# Patient Record
Sex: MALE | Race: WHITE
[De-identification: names, ages, dates, MRNs, and addresses within clinical notes are randomized per-mention and may not be internally consistent; named-entity substitution may affect disease eponyms.]

---

## 2023-10-08 ENCOUNTER — HOSPITAL ENCOUNTER (EMERGENCY)
Age: 73
Discharge: HOME | End: 2023-10-08
Payer: MEDICARE

## 2023-10-08 VITALS
DIASTOLIC BLOOD PRESSURE: 74 MMHG | HEART RATE: 76 BPM | TEMPERATURE: 98.1 F | OXYGEN SATURATION: 96 % | SYSTOLIC BLOOD PRESSURE: 135 MMHG | RESPIRATION RATE: 16 BRPM

## 2023-10-08 VITALS — BODY MASS INDEX: 25.8 KG/M2

## 2023-10-08 DIAGNOSIS — S09.8XXA: ICD-10-CM

## 2023-10-08 DIAGNOSIS — S01.01XA: Primary | ICD-10-CM

## 2023-10-08 DIAGNOSIS — Z23: ICD-10-CM
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
## 2023-10-08 PROCEDURE — 90471 IMMUNIZATION ADMIN: CPT

## 2023-10-08 PROCEDURE — 90715 TDAP VACCINE 7 YRS/> IM: CPT

## 2023-10-08 PROCEDURE — 12002 RPR S/N/AX/GEN/TRNK2.6-7.5CM: CPT

## 2023-10-08 PROCEDURE — 99284 EMERGENCY DEPT VISIT MOD MDM: CPT

## 2023-10-08 PROCEDURE — 70450 CT HEAD/BRAIN W/O DYE: CPT

## 2023-10-08 NOTE — ED_ITS
HPI - Head Injury    
General    
Chief complaint: Head Injury    
Stated complaint: FALL/LACERATION ON BACK OF HEAD    
Time Seen by Provider: 10/08/23 14:27    
Source: patient and family    
Mode of arrival: walk-in    
Limitations: no limitations    
History of Present Illness    
HPI Narrative:     
Patient fell back while in his camper and struck the back of his head along a   
cabinet edge.  No LOC and he denies any headache at this time. He sustained a   
large laceration to the back of the head.  The bleeding has slowed substantially  
from what it was initially, the lady accompanying the patient told me.  His   
tetanus is not up to date.    
He does not take blood thinners.    
No nausea, vomiting. No blurred vision or ringing in the ears. no ear pain, neck  
pain or back pain.    
Related Data    
                                    Allergies    
    
    
    
Allergy/AdvReac Type Severity Reaction Status Date / Time    
     
cephalexin [From Keflex] Allergy Severe Anaphylaxis Verified 10/08/23 14:11    
    
    
    
    
[facility]    
[facility]    
Social History    
Smoking status:  Never smoker     
    
    
    
Exam    
Narrative    
Exam Narrative:     
Nurses note and vital signs reviewed and patient is not hypoxic.    
afebrile    
General:  The patient appears well and in no apparent distress.  Patient is   
resting comfortably on cart.  GCS = 15.    
Skin:  Warm, dry, no pallor noted.    
Head:  7cm vertical, linear laceration to the occiput. Remainder of the face and  
scalp are normocephalic, atraumatic    
Neck:  Supple, trachea mid-line.  Full ROM and no cervical spinal tenderness.     
Eyes:  PERRLA, EOMI    
ENT:  TMs clear, no hemotympanum detected, no blood in posterior oropharynx    
Cardiovascular:  Regular Rate and Rhythm    
Respiratory:  Patient is in no distress, no accessory muscle use, lungs are   
clear to auscultation, no wheezing, rales or rhonchi    
Chest Wall:  no tenderness, no flail chest, contusion, abrasion, or signs of   
trauma.    
Back: No thoracic or lumbar tenderness to palpation.  Negative straight leg   
raise bilaterally.    
Musculoskeletal:  no sign of long bone fracture.  Moves all four extremities in   
all modalities with 5/5 strength.    
Neurological:  A&O x4, normal equal  strength, normal finger to nose, normal  
speech, normal coordination, normal motor, normal sensory.    
Psychiatric:  Cooperative    
Constitutional    
Vital Signs, click to edit/add:     
    
                                Last Vital Signs    
    
    
    
Temp  98.1 F   10/08/23 14:07    
     
Pulse  76   10/08/23 14:07    
     
Resp  16   10/08/23 14:07    
     
BP  135/74   10/08/23 14:07    
     
Pulse Ox  96   10/08/23 14:07    
     
O2 Del Method  Room Air  10/08/23 14:07    
    
    
    
    
    
Course    
Vital Signs    
Vital signs:     
    
                                   Vital Signs    
    
    
    
Temperature  98.1 F   10/08/23 14:07    
     
Pulse Rate  76   10/08/23 14:07    
     
Respiratory Rate  16   10/08/23 14:07    
     
Blood Pressure  135/74   10/08/23 14:07    
     
Pulse Oximetry  96   10/08/23 14:07    
     
Oxygen Delivery Method  Room Air  10/08/23 14:07    
    
    
                                            
    
    
    
Temperature  98.1 F   10/08/23 14:07    
     
Pulse Rate  76   10/08/23 14:07    
     
Respiratory Rate  16   10/08/23 14:07    
     
Blood Pressure  135/74   10/08/23 14:07    
     
Pulse Oximetry  96   10/08/23 14:07    
     
Oxygen Delivery Method  Room Air  10/08/23 14:07    
    
    
    
    
    
MDM - Head Injury    
MDM Narrative    
Medical decision making narrative:     
the patient's tetanus was updated. He was sent for noncontrast CT scanning of   
the brain. I then applied sterile staples to close the wound. Please see the   
procedure note above    
with CT did not reveal any acute intracranial hemorrhage or skull fracture. The   
patient was given reassurance and discharged home. He will need to have his   
staples removed in 7-10 days as described in the procedure note above.    
Imaging Data    
CT scan - head:     
      Attestation: I have reviewed the pertinent imaging results.    
      My impression:     
no ICH or skull fx    
      Radiologist's impression:     
Patient Name:    
KLEBER BYRD    
MRN: [id number]    
    YOB: 1950    Sex: M    
Assigned Patient Location:     
Current Patient Location:     
Accession/Order Number: [id number]    
Exam Date: 10/08/2023  14:34    Report    
 Date: 10/08/2023  15:07    
At the request of:    
ROBERT SERNA      
Procedure:  CT head/brain wo con    
CT head/brain wo con     
CLINICAL HISTORY:    
 occipital head trauma.    
COMPARISON: None available.    
CT HEAD    
 TECHNIQUE:    
 Noncontrast axial    
 CT images    
 of the    
 brain were obtained from    
the vertex    
 through    
 the    
 skull base. Sagittal and coronal    
 reconstructions. Bone     
and brain windows are provided for review.    
Dose reduction techniques    
 were    
 achieved    
 by using automated exposure control     
and/or adjustment of mA and/or    
 kV according    
 to patient size and/or    
 use of     
iterative reconstruction technique.    
CT HEAD    
 FINDINGS:    
Posterior scalp with mild contusion and    
 lacerations but no calvarial fracture.    
Global volume loss    
 and ex vacuo prominence of the ventricles.     
Multifocal white matter hypodensity is    
 nonspecific but likely from small vessel     
ischemic disease.    
No mass, midline    
 shift, acute bleed, or    
 extra-axial    
 fluid    
 collection.    
No CT evidence of acute    
 large territorial infarction.    
Visualized paranasal sinuses are clear.    
The mastoid air    
 cells are well aerated.    
Orbital contents are free    
 of disease.     
IMPRESSION: Mild posterior scalp injury but no underlying calvarial    
 fracture or     
acute intracranial    
 process.    
Electronically authenticated by: GUSTAVO SMALLWOOD   Date: 10/08/2023  15:07    
    
Discharge Plan    
Discharge    
Chief Complaint: Head Injury    
    
Clinical Impression:    
 Closed head injury, Laceration of occipital scalp    
    
    
Patient Disposition: Home, Self-Care    
    
Time of Disposition Decision: 15:11    
    
Instructions:  Laceration (ED), Head Injury (ED), Staple Care (ED)    
    
Stand Alone Forms:  Portal Instructions    
    
Referrals:    
KATELYNN BARKER [Primary Care Provider] - 1 week    
    
    
Procedures    
ED Laceration    
Laceration    
Laceration 1:     
      Additional comments:     
occipital scalp laceration    
Laceration repair:  All of the procedure was done under sterile conditions.    
Wound cleansed with betadine and anesthetized with local injection of   
approximately 8mL of lidocaine 1% with epinephrine.  The wound was irrigated   
copiously with sterile normal saline.  The wound was explored to depth and found  
to be free of foreign material.  The laceration wound edges were well-  
approximated and did not require revision.  Wound closed with 8 sterile staples   
in simple interrupted fashion.  Patient tolerated the procedure well.  The   
patient was neurovascularly intact post-repair.  Topical bacitracin applied to   
the laceration and it was dressed with a dry sterile dressing.  The patient will  
need to follow-up in the next 7-10 days for removal.

## 2023-10-08 NOTE — ED.HEATRA1
HPI - Head Injury
General
Chief complaint: Head Injury
Stated complaint: FALL/LACERATION ON BACK OF HEAD
Time Seen by Provider: 10/08/23 14:27
Source: patient and family
Mode of arrival: walk-in
Limitations: no limitations
History of Present Illness
HPI Narrative: 
Patient fell back while in his camper and struck the back of his head along a cabinet edge.  No LOC and he denies any headache at this time. He sustained a large laceration to the back of the head.  The bleeding has slowed substantially from what it 
was initially, the lady accompanying the patient told me.  His tetanus is not up to date.
He does not take blood thinners.
No nausea, vomiting. No blurred vision or ringing in the ears. no ear pain, neck pain or back pain.
Related Data
Allergies

Allergy/AdvReac Type Severity Reaction Status Date / Time
cephalexin [From Keflex] Allergy Severe Anaphylaxis Verified 10/08/23 14:11



\[facility] [place] [facility]\""H
[facility]
Social History
Smoking status:  Never smoker 



Exam
Narrative
Exam Narrative: 
Nurses note and vital signs reviewed and patient is not hypoxic.
afebrile
General:  The patient appears well and in no apparent distress.  Patient is resting comfortably on cart.  GCS = 15.
Skin:  Warm, dry, no pallor noted.
Head:  7cm vertical, linear laceration to the occiput. Remainder of the face and scalp are normocephalic, atraumatic
Neck:  Supple, trachea mid-line.  Full ROM and no cervical spinal tenderness. 
Eyes:  PERRLA, EOMI
ENT:  TMs clear, no hemotympanum detected, no blood in posterior oropharynx
Cardiovascular:  Regular Rate and Rhythm
Respiratory:  Patient is in no distress, no accessory muscle use, lungs are clear to auscultation, no wheezing, rales or rhonchi
Chest Wall:  no tenderness, no flail chest, contusion, abrasion, or signs of trauma.
Back: No thoracic or lumbar tenderness to palpation.  Negative straight leg raise bilaterally.
Musculoskeletal:  no sign of long bone fracture.  Moves all four extremities in all modalities with 5/5 strength.
Neurological:  A&O x4, normal equal  strength, normal finger to nose, normal speech, normal coordination, normal motor, normal sensory.
Psychiatric:  Cooperative
Constitutional
Vital Signs, click to edit/add: 

Last Vital Signs

Temp  98.1 F   10/08/23 14:07
Pulse  76   10/08/23 14:07
Resp  16   10/08/23 14:07
BP  135/74   10/08/23 14:07
Pulse Ox  96   10/08/23 14:07
O2 Del Method  Room Air  10/08/23 14:07




Course
Vital Signs
Vital signs: 

Vital Signs

Temperature  98.1 F   10/08/23 14:07
Pulse Rate  76   10/08/23 14:07
Respiratory Rate  16   10/08/23 14:07
Blood Pressure  135/74   10/08/23 14:07
Pulse Oximetry  96   10/08/23 14:07
Oxygen Delivery Method  Room Air  10/08/23 14:07



Temperature  98.1 F   10/08/23 14:07
Pulse Rate  76   10/08/23 14:07
Respiratory Rate  16   10/08/23 14:07
Blood Pressure  135/74   10/08/23 14:07
Pulse Oximetry  96   10/08/23 14:07
Oxygen Delivery Method  Room Air  10/08/23 14:07




MDM - Head Injury
MDM Narrative
Medical decision making narrative: 
the patient's tetanus was updated. He was sent for noncontrast CT scanning of the brain. I then applied sterile staples to close the wound. Please see the procedure note above
with CT did not reveal any acute intracranial hemorrhage or skull fracture. The patient was given reassurance and discharged home. He will need to have his staples removed in 7-10 days as described in the procedure note above.
Imaging Data
CT scan - head: 
      Attestation: I have reviewed the pertinent imaging results.
      My impression: 
no ICH or skull fx
      Radiologist's impression: 
Patient Name:
KLEBER BYRD
MRN: [id number]
    YOB: 1950    Sex: M
Assigned Patient Location: 
Current Patient Location: 
Accession/Order Number: [id number]
Exam Date: 10/08/2023  14:34    Report
 Date: 10/08/2023  15:07
At the request of:
ROBERT SERNA  
Procedure:  CT head/brain wo con
CT head/brain wo con 
CLINICAL HISTORY:
 occipital head trauma.
COMPARISON: None available.
CT HEAD
 TECHNIQUE:
 Noncontrast axial
 CT images
 of the
 brain were obtained from
the vertex
 through
 the
 skull base. Sagittal and coronal
 reconstructions. Bone 
and brain windows are provided for review.
Dose reduction techniques
 were
 achieved
 by using automated exposure control 
and/or adjustment of mA and/or
 kV according
 to patient size and/or
 use of 
iterative reconstruction technique.
CT HEAD
 FINDINGS:
Posterior scalp with mild contusion and
 lacerations but no calvarial fracture.
Global volume loss
 and ex vacuo prominence of the ventricles. 
Multifocal white matter hypodensity is
 nonspecific but likely from small vessel 
ischemic disease.
No mass, midline
 shift, acute bleed, or
 extra-axial
 fluid
 collection.
No CT evidence of acute
 large territorial infarction.
Visualized paranasal sinuses are clear.
The mastoid air
 cells are well aerated.
Orbital contents are free
 of disease. 
IMPRESSION: Mild posterior scalp injury but no underlying calvarial
 fracture or 
acute intracranial
 process.
Electronically authenticated by: GUSTAVO SMALLWOOD   Date: 10/08/2023  15:07

Discharge Plan
Discharge
Chief Complaint: Head Injury

Clinical Impression:
 Closed head injury, Laceration of occipital scalp


Patient Disposition: Home, Self-Care

Time of Disposition Decision: 15:11

Instructions:  Laceration (ED), Head Injury (ED), Staple Care (ED)

Stand Alone Forms:  Portal Instructions

Referrals:
KATELYNN BARKER [Primary Care Provider] - 1 week


Procedures
ED Laceration
Laceration
Laceration 1: 
      Additional comments: 
occipital scalp laceration
Laceration repair:  All of the procedure was done under sterile conditions.  Wound cleansed with betadine and anesthetized with local injection of approximately 8mL of lidocaine 1% with epinephrine.  The wound was irrigated copiously with sterile 
normal saline.  The wound was explored to depth and found to be free of foreign material.  The laceration wound edges were well-approximated and did not require revision.  Wound closed with 8 sterile staples in simple interrupted fashion.  Patient 
tolerated the procedure well.  The patient was neurovascularly intact post-repair.  Topical bacitracin applied to the laceration and it was dressed with a dry sterile dressing.  The patient will need to follow-up in the next 7-10 days for removal.

## 2023-10-08 NOTE — CT_ITS
The [address] [address] 
     [phone] 
  
  
Patient Name: 
KLEBER BYRD 
  
MRN: [id number]    YOB: 1950    Sex: M 
Assigned Patient Location: ER 
Current Patient Location: ER 
Accession/Order Number: [id number] 
Exam Date: 10/08/2023  14:34    Report Date: 10/08/2023  15:07 
  
At the request of: 
ROBERT SERNA   
  
Procedure:  CT head/brain wo con 
  
CT head/brain wo con  
  
CLINICAL HISTORY: occipital head trauma. 
  
COMPARISON: None available. 
  
CT HEAD TECHNIQUE: Noncontrast axial CT images of the brain were obtained from  
  
the vertex through the skull base. Sagittal and coronal reconstructions. Bone  
and brain windows are provided for review. 
  
Dose reduction techniques were achieved by using automated exposure control  
and/or adjustment of mA and/or kV according to patient size and/or use of  
iterative reconstruction technique. 
  
CT HEAD FINDINGS: 
Posterior scalp with mild contusion and lacerations but no calvarial fracture. 
Global volume loss and ex vacuo prominence of the ventricles.  
Multifocal white matter hypodensity is nonspecific but likely from small  
vessel  
ischemic disease. 
No mass, midline shift, acute bleed, or extra-axial fluid collection. 
No CT evidence of acute large territorial infarction. 
  
Visualized paranasal sinuses are clear. 
The mastoid air cells are well aerated. 
Orbital contents are free of disease.  
  
ORDER #: [id number] CT/CT head/brain wo con  
IMPRESSION: Mild posterior scalp injury but no underlying calvarial fracture   
or   
acute intracranial process.  
   
   
Electronically authenticated by: GUSTAVO SMALLWOOD   Date: 10/08/2023  15:07

## 2023-10-09 PROCEDURE — 93306 TTE W/DOPPLER COMPLETE: CPT | Performed by: INTERNAL MEDICINE

## 2023-10-28 PROBLEM — H90.3 BILATERAL SENSORINEURAL HEARING LOSS: Status: ACTIVE | Noted: 2023-10-28

## 2023-10-28 PROBLEM — R26.89 IMBALANCE: Status: ACTIVE | Noted: 2023-10-28

## 2023-10-28 RX ORDER — MECLIZINE HYDROCHLORIDE 25 MG/1
TABLET ORAL
COMMUNITY
End: 2023-10-30 | Stop reason: ALTCHOICE

## 2023-10-30 ENCOUNTER — OFFICE VISIT (OUTPATIENT)
Dept: CARDIOLOGY | Facility: CLINIC | Age: 73
End: 2023-10-30
Payer: MEDICARE

## 2023-10-30 VITALS
HEIGHT: 70 IN | BODY MASS INDEX: 27.06 KG/M2 | WEIGHT: 189 LBS | SYSTOLIC BLOOD PRESSURE: 152 MMHG | HEART RATE: 77 BPM | DIASTOLIC BLOOD PRESSURE: 94 MMHG

## 2023-10-30 DIAGNOSIS — I10 ESSENTIAL HYPERTENSION, BENIGN: ICD-10-CM

## 2023-10-30 DIAGNOSIS — I35.0 NONRHEUMATIC AORTIC VALVE STENOSIS: Primary | ICD-10-CM

## 2023-10-30 PROCEDURE — 3008F BODY MASS INDEX DOCD: CPT | Performed by: INTERNAL MEDICINE

## 2023-10-30 PROCEDURE — 3080F DIAST BP >= 90 MM HG: CPT | Performed by: INTERNAL MEDICINE

## 2023-10-30 PROCEDURE — 93000 ELECTROCARDIOGRAM COMPLETE: CPT | Performed by: INTERNAL MEDICINE

## 2023-10-30 PROCEDURE — 1159F MED LIST DOCD IN RCRD: CPT | Performed by: INTERNAL MEDICINE

## 2023-10-30 PROCEDURE — 99204 OFFICE O/P NEW MOD 45 MIN: CPT | Performed by: INTERNAL MEDICINE

## 2023-10-30 PROCEDURE — 1036F TOBACCO NON-USER: CPT | Performed by: INTERNAL MEDICINE

## 2023-10-30 PROCEDURE — 1160F RVW MEDS BY RX/DR IN RCRD: CPT | Performed by: INTERNAL MEDICINE

## 2023-10-30 PROCEDURE — 3077F SYST BP >= 140 MM HG: CPT | Performed by: INTERNAL MEDICINE

## 2023-10-30 RX ORDER — ZOLPIDEM TARTRATE 10 MG/1
5 TABLET ORAL NIGHTLY PRN
COMMUNITY
Start: 2023-09-29

## 2023-10-30 ASSESSMENT — ENCOUNTER SYMPTOMS
DIZZINESS: 1
DYSPNEA ON EXERTION: 1

## 2023-10-30 NOTE — PATIENT INSTRUCTIONS
Please bring all medicines, vitamins, and herbal supplements with you when you come to the office.    Prescriptions will not be filled unless you are compliant with your follow up appointments or have a follow up appointment scheduled as per instruction of your physician. Refills should be requested at the time of your visit.     Fall Prevention Education Given    Echo reviewed  Signs and symptoms reviewed  Reduce salt intake. Monitor b/p at home  Follow up 6 months

## 2023-10-30 NOTE — PROGRESS NOTES
Subjective   Carl Sherman is a 73 y.o. male       Chief Complaint    New Patient Visit          HPI     Patient is 73-year-old white male who recently noted to have a cardiac murmur which led to an echocardiogram that showing moderate severe aortic stenosis.  The patient is a .  He remains very active.  He denies any complaint of chest pain, palpitation, or syncope.  He reports very minor shortness of breath with heavy activity when he carry heavy stuff.  And rare dizziness.  His recent echocardiogram noted and reviewed with him.  The patient noted today to have slightly elevated the blood pressure.  Patient admits to salt indiscretion.  Recent echo noted and reviewed with him.    Assessment    1.  Moderate to severe nonrheumatic aortic stenosis does not appear to be symptomatic  2.  Probably stage I hypertension  3.  Rare intermittent edema  4.  Patient reports salt indiscretion  5.  Mildly overweight  6.  Multiple orthopedic procedures in the past  7.  Chronic back pain with prior back surgery    Plan    1.  Currently the patient appears in asymptomatic based on his description.  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  We will continue with observant approach  2.  The natural history of aortic stenosis discussed with him  3.  I advised him to notify if develop any chest pain, increasing shortness of breath or dizziness  4.  The patient on his way to [place].  I advised him to restrict his salt intake and to monitor his blood pressure and notify me if it remains elevated  5.  I will see him back in the office in 6 months for follow-up    Review of Systems   Constitutional: Positive for malaise/fatigue.   Cardiovascular:  Positive for dyspnea on exertion and leg swelling.   Musculoskeletal:         Chronic back pain   Neurological:  Positive for dizziness.   All other systems reviewed and are negative.         Visit Vitals  BP (!) 152/94 (BP Location:  "Right arm, Patient Position: Sitting)   Pulse 77   Ht 1.778 m (5' 10\")   Wt 85.7 kg (189 lb)   BMI 27.12 kg/m²   Smoking Status Never   BSA 2.06 m²        Objective   Physical Exam  Constitutional:       Appearance: Normal appearance. He is normal weight.   HENT:      Nose: Nose normal.   Neck:      Vascular: No carotid bruit.   Cardiovascular:      Rate and Rhythm: Normal rate.      Pulses: Normal pulses.      Heart sounds: Murmur heard.      Systolic murmur is present with a grade of 3/6.   Pulmonary:      Effort: Pulmonary effort is normal.   Abdominal:      General: Bowel sounds are normal.      Palpations: Abdomen is soft.   Genitourinary:     Rectum: Normal.   Musculoskeletal:         General: Normal range of motion.      Cervical back: Normal range of motion.      Right lower leg: No edema.      Left lower leg: No edema.   Skin:     General: Skin is warm and dry.   Neurological:      General: No focal deficit present.      Mental Status: He is alert.   Psychiatric:         Mood and Affect: Mood normal.         Behavior: Behavior normal.         Thought Content: Thought content normal.         Judgment: Judgment normal.         Current Medications    Current Outpatient Medications:     zolpidem (Ambien) 10 mg tablet, 0.5 tablets (5 mg) as needed at bedtime., Disp: , Rfl:                      Assessment/Plan   1. Nonrheumatic aortic valve stenosis  ECG 12 Lead    Follow Up In Cardiology      2. BMI 27.0-27.9,adult        3. Essential hypertension, benign            "

## 2024-04-16 ENCOUNTER — APPOINTMENT (OUTPATIENT)
Dept: CARDIOLOGY | Facility: CLINIC | Age: 74
End: 2024-04-16
Payer: MEDICARE

## 2024-04-25 ENCOUNTER — OFFICE VISIT (OUTPATIENT)
Dept: CARDIOLOGY | Facility: CLINIC | Age: 74
End: 2024-04-25
Payer: MEDICARE

## 2024-04-25 VITALS
WEIGHT: 196 LBS | HEART RATE: 64 BPM | HEIGHT: 70 IN | SYSTOLIC BLOOD PRESSURE: 148 MMHG | BODY MASS INDEX: 28.06 KG/M2 | DIASTOLIC BLOOD PRESSURE: 86 MMHG

## 2024-04-25 DIAGNOSIS — I10 ESSENTIAL HYPERTENSION, BENIGN: Primary | ICD-10-CM

## 2024-04-25 DIAGNOSIS — I35.0 NONRHEUMATIC AORTIC VALVE STENOSIS: ICD-10-CM

## 2024-04-25 DIAGNOSIS — R06.02 SHORTNESS OF BREATH: ICD-10-CM

## 2024-04-25 DIAGNOSIS — R26.89 IMBALANCE: ICD-10-CM

## 2024-04-25 DIAGNOSIS — Z78.9 NEVER SMOKED TOBACCO: ICD-10-CM

## 2024-04-25 DIAGNOSIS — I10 WHITE COAT SYNDROME WITH DIAGNOSIS OF HYPERTENSION: ICD-10-CM

## 2024-04-25 PROCEDURE — 1160F RVW MEDS BY RX/DR IN RCRD: CPT | Performed by: INTERNAL MEDICINE

## 2024-04-25 PROCEDURE — 1159F MED LIST DOCD IN RCRD: CPT | Performed by: INTERNAL MEDICINE

## 2024-04-25 PROCEDURE — 3077F SYST BP >= 140 MM HG: CPT | Performed by: INTERNAL MEDICINE

## 2024-04-25 PROCEDURE — 1036F TOBACCO NON-USER: CPT | Performed by: INTERNAL MEDICINE

## 2024-04-25 PROCEDURE — 99215 OFFICE O/P EST HI 40 MIN: CPT | Performed by: INTERNAL MEDICINE

## 2024-04-25 PROCEDURE — 3079F DIAST BP 80-89 MM HG: CPT | Performed by: INTERNAL MEDICINE

## 2024-04-25 ASSESSMENT — ENCOUNTER SYMPTOMS
LIGHT-HEADEDNESS: 1
SHORTNESS OF BREATH: 1

## 2024-04-25 NOTE — PATIENT INSTRUCTIONS
Please bring all medicines, vitamins, and herbal supplements with you when you come to the office.    Prescriptions will not be filled unless you are compliant with your follow up appointments or have a follow up appointment scheduled as per instruction of your physician. Refills should be requested at the time of your visit.     Fall Prevention Education Given     BMI was above normal measurement. Current weight: 88.9 kg (196 lb)  Weight change since last visit (-) denotes wt loss 7 lbs   Weight loss needed to achieve BMI 25: 22.1 Lbs  Weight loss needed to achieve BMI 30: -12.6 Lbs  Provided instructions on dietary changes.    Damian  Heart cath  Follow up after testing

## 2024-04-25 NOTE — PROGRESS NOTES
"Subjective   Carl Sherman is a 74 y.o. male       Chief Complaint    Follow-up          HPI   Patient is here for follow-up continue management for aortic stenosis.  He was seen recently for close to severe range aortic stenosis at that point of time he reported he was asymptomatic.  Over the last 3 to 4 months the patient has describing fatigue, tiredness, shortness of breath with activity with functional class II-III.  And occasional lightheadedness.  His clinical symptoms highly suggestive for progression of his aortic valve disease.  He had no other complaint.    Assessment     1.  Moderate to severe nonrheumatic aortic stenosis appears symptomatic with increasing shortness of breath and lightheadedness  2.  Probably stage I hypertension with whitecoat hypertension  3.  Rare intermittent edema  4.  Patient reports salt indiscretion  5.  Mildly overweight  6.  Multiple orthopedic procedures in the past  7.  Chronic back pain with prior back surgery     Plan     1.  Based on patient's symptoms I am highly concerned that his aortic valve is now symptomatic.  I recommended proceeding transesophageal echocardiogram and cardiac catheterization.  Risk, benefits alternative reviewed with patient at length he understood and agreed  2.  The natural history of aortic stenosis discussed with him  3.  The process of proceeding with TAVR discussed with him including the need for CTA all of the above reviewed with him and his wife more than 30 minutes spent discussing all those issues  4.  Follow-up after testing is done  Review of Systems   Constitutional: Positive for malaise/fatigue.   Respiratory:  Positive for shortness of breath.    Neurological:  Positive for light-headedness.            Vitals:    04/25/24 1402   BP: 148/86   BP Location: Right arm   Patient Position: Sitting   Pulse: 64   Weight: 88.9 kg (196 lb)   Height: 1.778 m (5' 10\")        Objective   Physical Exam  Constitutional:       Appearance: Normal " appearance.   HENT:      Nose: Nose normal.   Neck:      Vascular: No carotid bruit.   Cardiovascular:      Rate and Rhythm: Normal rate.      Pulses: Normal pulses.      Heart sounds: Murmur heard.      Systolic murmur is present with a grade of 3/6.   Pulmonary:      Effort: Pulmonary effort is normal.   Abdominal:      General: Bowel sounds are normal.      Palpations: Abdomen is soft.   Musculoskeletal:         General: Normal range of motion.      Cervical back: Normal range of motion.      Right lower leg: No edema.      Left lower leg: No edema.   Skin:     General: Skin is warm and dry.   Neurological:      General: No focal deficit present.      Mental Status: He is alert.   Psychiatric:         Mood and Affect: Mood normal.         Behavior: Behavior normal.         Thought Content: Thought content normal.         Judgment: Judgment normal.         Allergies  Cephalexin     Current Medications    Current Outpatient Medications:     zolpidem (Ambien) 10 mg tablet, 0.5 tablets (5 mg) as needed at bedtime., Disp: , Rfl:                      Assessment/Plan   1. Essential hypertension, benign        2. Nonrheumatic aortic valve stenosis  Follow Up In Cardiology    Follow Up In Cardiology    Transesophageal Echo (PACHECO)      3. BMI 27.0-27.9,adult        4. Imbalance        5. Never smoked tobacco        6. Shortness of breath  Transesophageal Echo (PACHECO)      7. White coat syndrome with diagnosis of hypertension                 Scribe Attestation  By signing my name below, Marianna MTZ LPN, Scribe   attest that this documentation has been prepared under the direction and in the presence of Lizette Zapata MD.     Provider Attestation - Scribe documentation    All medical record entries made by the Aubreeibjaquelin were at my direction and personally dictated by me. I have reviewed the chart and agree that the record accurately reflects my personal performance of the history, physical exam, discussion and plan.

## 2024-04-26 ENCOUNTER — TELEPHONE (OUTPATIENT)
Dept: CARDIOLOGY | Facility: CLINIC | Age: 74
End: 2024-04-26
Payer: MEDICARE

## 2024-04-26 NOTE — TELEPHONE ENCOUNTER
PACHECO/[id number] DX-I35.0, R06.02   LEFT HEART CATH/63371 DX-I35.0, R06.02 NO AUTH REQUIRED WITH MEDICARE

## 2024-05-07 LAB
NON-UH HIE ANION GAP: 11 (ref 6–15)
NON-UH HIE BASOPHILS # (AUTO): 0 10*3/UL (ref 0–0.2)
NON-UH HIE BASOPHILS % (AUTO): 1.1 %
NON-UH HIE BLOOD UREA NITROGEN: 18 MG/DL (ref 7–25)
NON-UH HIE CARBON DIOXIDE: 29.8 MMOL/L (ref 21–31)
NON-UH HIE CHLORIDE: 104 MMOL/L (ref 98–107)
NON-UH HIE CHOL/HDL RATIO: 3
NON-UH HIE CHOLESTEROL: 194 MG/DL (ref 140–200)
NON-UH HIE CREATININE: 0.87 MG/DL (ref 0.7–1.3)
NON-UH HIE EOSINOPHILS # (AUTO): 0.1 10*3/UL (ref 0–0.45)
NON-UH HIE EOSINOPHILS % (AUTO): 2.3 %
NON-UH HIE ESTIMATED GFR: > 60
NON-UH HIE HDL CHOLESTEROL: 64 MG/DL (ref 23–92)
NON-UH HIE HEMATOCRIT: 45.6 % (ref 38.8–50)
NON-UH HIE HEMOGLOBIN: 15.9 G/DL (ref 13–17)
NON-UH HIE INR: 1
NON-UH HIE LDL CHOLESTEROL,CALCULATED: 117 MG/DL (ref 0–100)
NON-UH HIE LYMPHOCYTES # (AUTO): 1.1 10*3/UL (ref 1–4.8)
NON-UH HIE LYMPHOCYTES % (AUTO): 24 %
NON-UH HIE MEAN CORPUSCULAR HEMOGLOBIN: 32.8 PG (ref 27.5–35.2)
NON-UH HIE MEAN CORPUSCULAR HGB CONC: 34.9 G/DL (ref 32.5–35.6)
NON-UH HIE MEAN CORPUSCULAR VOLUME: 94.2 FL (ref 83.5–101)
NON-UH HIE MEAN PLATELET VOLUME: 8.9 FL (ref 6.6–10.1)
NON-UH HIE MONOCYTES # (AUTO): 0.4 10*3/UL (ref 0–0.8)
NON-UH HIE MONOCYTES % (AUTO): 9.4 %
NON-UH HIE NEUTROPHILS # (AUTO): 2.8 10*3/UL (ref 1.8–7.7)
NON-UH HIE NEUTROPHILS % (AUTO): 63.2 %
NON-UH HIE NRBC%: 0.2 /100{WBC} (ref 0–0.5)
NON-UH HIE PARTIAL THROMBOPLASTIN TIME: 33.8 S (ref 25.1–36.5)
NON-UH HIE PLATELET COUNT: 178 10*3/UL (ref 150–450)
NON-UH HIE POTASSIUM: 4.8 MMOL/L (ref 3.5–5.1)
NON-UH HIE PROTHROMBIN TIME: 11.3 S (ref 9–12.9)
NON-UH HIE RED BLOOD COUNT: 4.84 (ref 3.9–5.6)
NON-UH HIE RED CELL DISTRIBUTION WIDTH: 13.3 % (ref 12–14.8)
NON-UH HIE SODIUM: 140 MMOL/L (ref 136–145)
NON-UH HIE TRIGLYCERIDE W/REFLEX: 64 MG/DL (ref 0–149)
NON-UH HIE UNCORRECTED WBC: 4.5 10*3/UL (ref 4.1–10.5)
NON-UH HIE VLDL CHOLESTEROL: 12 MG/DL
NON-UH HIE WHITE BLOOD COUNT: 4.5 10*3/UL (ref 4.1–10.5)

## 2024-05-15 ENCOUNTER — TELEPHONE (OUTPATIENT)
Dept: CARDIOLOGY | Facility: HOSPITAL | Age: 74
End: 2024-05-15
Payer: MEDICARE

## 2024-05-15 ENCOUNTER — TELEPHONE (OUTPATIENT)
Dept: CARDIOLOGY | Facility: CLINIC | Age: 74
End: 2024-05-15
Payer: MEDICARE

## 2024-05-15 DIAGNOSIS — I35.0 NONRHEUMATIC AORTIC VALVE STENOSIS: Primary | ICD-10-CM

## 2024-05-28 PROBLEM — M12.819 ROTATOR CUFF ARTHROPATHY: Status: RESOLVED | Noted: 2017-04-18 | Resolved: 2024-05-28

## 2024-05-28 NOTE — PROGRESS NOTES
Cardio: Benny      HPI:   73 y/o gentleman with pmh of HTN, carpel tunnel syndrome, and skin CA (s/p Sx).    The patient has progressive BERGERON and CP for the last several months and have been progressive. He also reports significant fatigue. He had a TTE in 05/2024 which showed EF 60-65%, calcified AV, PV 3.8, MF 35 mmHg, MELANIE 0.66 (we dont have images). The patient also had a PACHECO which showed MG 44 mmHg, MELANIE 0.7 (we dont have images). The patient also had LHC which showed non-obs CAD.    The patient is very active and continues to work full time.  He is independent in ADLs and IADLs. He doesn't use a aid to walk.       ROS:    Constitutional: fatigue  Eyes: no acute eye problems, no blurred vision, no diplopia, no eye pain  ENT: no nosebleeds, no acute hearing loss, no earache, no sore throat  Cardiovascular: dyspnea on exertion, no chest pain  Respiratory: no chronic cough, not coughing up sputum, no wheezing that is consistent with asthma  Gastrointestinal: no acute bowel complaints  Musculoskeletal: no acute arthralgias, no acute myalgias, no acute joint swelling  Skin: no skin rashes, no change in skin color and pigmentation, no skin lesions and no skin lumps.   Neurological: no headaches, no dizziness, no tingling, no fainting and no limb weakness.   Psychiatric:  no suicidal ideation, no confusion, no personality change and no emotional problems.   Hematologic/Lymphatic: no bleeding issues.   All other systems have been reviewed and are negative for complaint.       TAVR Workup:   - NYHA: II  - Frailty: 0/5  - EKG: no recent in the system  - TTE: 05/2024 which showed EF 60-65%, calcified AV, PV 3.8, MF 35 mmHg, MELANIE 0.66 (we dont have images)  - CT TAVR: pending  - LHC: non osb CAD  - dental clearance: regular visits -- no reported issues  - STS  Procedure Type: Isolated AVR  PERIOPERATIVE OUTCOME ESTIMATE %  Operative Mortality 0.964%  Morbidity & Mortality 5.12%  Stroke 0.758%  Renal  "Failure 0.586%  Reoperation 3.72%  Prolonged Ventilation 1.92%  Deep Sternal Wound Infection 0.039%  Long Hospital Stay (>14 days) 1.71%  Short Hospital Stay (<6 days)* 65%  *higher values reflect a better outcome    Physical Exam:  Constitutional: alert and in no acute distress.   Eyes: no erythema, swelling or discharge from the eye .   ENT: no erythema, edema, exudate or lesions .   Neck: neck is supple, no JVD  Pulmonary: no increased work of breathing or signs of respiratory distress , lungs clear to auscultation. , normal percussion of chest  and chest palpation normal .   Cardiovascular: RRR, 3/6 ANNA MARIE RUSB,  no pitting edema, non-displaced PMI, no S3 or S4  Abdomen: abdomen non-tender, no masses  and no hepatomegaly .   Neurologic: non-focal neurologic examination.           10/30/2023     1:34 PM 10/30/2023     1:40 PM 4/25/2024     2:02 PM 5/29/2024    11:06 AM   Vitals   Systolic 158 152 148 146   Diastolic 102 94 86 74   Heart Rate 77 77 64 40   Height (in) 1.778 m (5' 10\") 1.778 m (5' 10\") 1.778 m (5' 10\") 1.829 m (6')   Weight (lb) 189 189 196 193.6   BMI 27.12 kg/m2 27.12 kg/m2 28.12 kg/m2 26.26 kg/m2   BSA (m2) 2.06 m2 2.06 m2 2.1 m2 2.11 m2   Visit Report Report Report Report Report    Report        Current Outpatient Medications   Medication Instructions    zolpidem (AMBIEN) 5 mg, Nightly PRN        Impression:   73 y/o gentleman with pmh of HTN, carpel tunnel syndrome, and skin CA (s/p Sx).    The patient has progressive NYHA class II HF symptoms in the setting of aortic stenosis    Plan:   CT TAVR  We will get LHC and TTE/PACHECO images from his cardiologist    Proceed with TAVR if anatomy feasible      We discussed all the risks associated with the procedure, including but not limited to stroke, MI, pericardial tamponade, vascular complications, infection and death were discussed with the patient. The risk of needing a permament pacemaker was also discussed in detail. The patient verbalized " understanding and decided to proceed with the procedure.     We will discuss this patient's case at our Valve Team meeting with representatives from [facility]. Our nurse navigators will contact patient with further diagnostic needs and formal plan.

## 2024-05-29 ENCOUNTER — OFFICE VISIT (OUTPATIENT)
Dept: CARDIOLOGY | Facility: HOSPITAL | Age: 74
End: 2024-05-29
Payer: MEDICARE

## 2024-05-29 ENCOUNTER — HOSPITAL ENCOUNTER (OUTPATIENT)
Dept: RADIOLOGY | Facility: HOSPITAL | Age: 74
Discharge: HOME | End: 2024-05-29
Payer: MEDICARE

## 2024-05-29 ENCOUNTER — OFFICE VISIT (OUTPATIENT)
Dept: CARDIAC SURGERY | Facility: HOSPITAL | Age: 74
End: 2024-05-29
Payer: MEDICARE

## 2024-05-29 VITALS
DIASTOLIC BLOOD PRESSURE: 74 MMHG | HEIGHT: 72 IN | WEIGHT: 193.6 LBS | BODY MASS INDEX: 26.22 KG/M2 | SYSTOLIC BLOOD PRESSURE: 146 MMHG | HEART RATE: 40 BPM | OXYGEN SATURATION: 95 %

## 2024-05-29 DIAGNOSIS — I35.0 NONRHEUMATIC AORTIC VALVE STENOSIS: ICD-10-CM

## 2024-05-29 PROCEDURE — 74174 CTA ABD&PLVS W/CONTRAST: CPT

## 2024-05-29 PROCEDURE — 2550000001 HC RX 255 CONTRASTS: Performed by: INTERNAL MEDICINE

## 2024-05-29 PROCEDURE — 99214 OFFICE O/P EST MOD 30 MIN: CPT | Performed by: INTERNAL MEDICINE

## 2024-05-29 PROCEDURE — 3077F SYST BP >= 140 MM HG: CPT | Performed by: THORACIC SURGERY (CARDIOTHORACIC VASCULAR SURGERY)

## 2024-05-29 PROCEDURE — 1159F MED LIST DOCD IN RCRD: CPT | Performed by: THORACIC SURGERY (CARDIOTHORACIC VASCULAR SURGERY)

## 2024-05-29 PROCEDURE — 99215 OFFICE O/P EST HI 40 MIN: CPT | Mod: 25 | Performed by: THORACIC SURGERY (CARDIOTHORACIC VASCULAR SURGERY)

## 2024-05-29 PROCEDURE — 1036F TOBACCO NON-USER: CPT | Performed by: THORACIC SURGERY (CARDIOTHORACIC VASCULAR SURGERY)

## 2024-05-29 PROCEDURE — 1159F MED LIST DOCD IN RCRD: CPT | Performed by: INTERNAL MEDICINE

## 2024-05-29 PROCEDURE — 1160F RVW MEDS BY RX/DR IN RCRD: CPT | Performed by: INTERNAL MEDICINE

## 2024-05-29 PROCEDURE — 1160F RVW MEDS BY RX/DR IN RCRD: CPT | Performed by: THORACIC SURGERY (CARDIOTHORACIC VASCULAR SURGERY)

## 2024-05-29 PROCEDURE — 74174 CTA ABD&PLVS W/CONTRAST: CPT | Performed by: RADIOLOGY

## 2024-05-29 PROCEDURE — 71275 CT ANGIOGRAPHY CHEST: CPT | Performed by: RADIOLOGY

## 2024-05-29 PROCEDURE — 3078F DIAST BP <80 MM HG: CPT | Performed by: THORACIC SURGERY (CARDIOTHORACIC VASCULAR SURGERY)

## 2024-05-29 PROCEDURE — 99205 OFFICE O/P NEW HI 60 MIN: CPT | Performed by: THORACIC SURGERY (CARDIOTHORACIC VASCULAR SURGERY)

## 2024-05-29 PROCEDURE — 1126F AMNT PAIN NOTED NONE PRSNT: CPT | Performed by: THORACIC SURGERY (CARDIOTHORACIC VASCULAR SURGERY)

## 2024-05-29 PROCEDURE — 99204 OFFICE O/P NEW MOD 45 MIN: CPT | Performed by: INTERNAL MEDICINE

## 2024-05-29 RX ADMIN — IOHEXOL 90 ML: 350 INJECTION, SOLUTION INTRAVENOUS at 13:03

## 2024-05-29 ASSESSMENT — PAIN SCALES - GENERAL: PAINLEVEL: 0-NO PAIN

## 2024-05-29 NOTE — PROGRESS NOTES
Cardio: Benny      HPI:   75 y/o gentleman with pmh of HTN, carpel tunnel syndrome, and skin CA (s/p Sx).    The patient has progressive BERGERON and CP for the last several months and have been progressive. He also reports significant fatigue. He had a TTE in 05/2024 which showed EF 60-65%, calcified AV, PV 3.8, MF 35 mmHg, MELANIE 0.66 (we dont have images). The patient also had a PACHECO which showed MG 44 mmHg, MELANIE 0.7 (we dont have images). The patient also had LHC which showed non-obs CAD.    The patient is very active and continues to work full time.  He is independent in ADLs and IADLs. He doesn't use a aid to walk.       ROS:    Constitutional: fatigue  Eyes: no acute eye problems, no blurred vision, no diplopia, no eye pain  ENT: no nosebleeds, no acute hearing loss, no earache, no sore throat  Cardiovascular: dyspnea on exertion, no chest pain  Respiratory: no chronic cough, not coughing up sputum, no wheezing that is consistent with asthma  Gastrointestinal: no acute bowel complaints  Musculoskeletal: no acute arthralgias, no acute myalgias, no acute joint swelling  Skin: no skin rashes, no change in skin color and pigmentation, no skin lesions and no skin lumps.   Neurological: no headaches, no dizziness, no tingling, no fainting and no limb weakness.   Psychiatric:  no suicidal ideation, no confusion, no personality change and no emotional problems.   Hematologic/Lymphatic: no bleeding issues.   All other systems have been reviewed and are negative for complaint.       TAVR Workup:   - NYHA: II  - Frailty: 0/5  - EKG: no recent in the system  - TTE: 05/2024 which showed EF 60-65%, calcified AV, PV 3.8, MF 35 mmHg, MELANIE 0.66 (we dont have images)  - CT TAVR: pending  - LHC: non osb CAD  - dental clearance: regular visits -- no reported issues  - STS  Procedure Type: Isolated AVR  PERIOPERATIVE OUTCOME ESTIMATE %  Operative Mortality 0.964%  Morbidity & Mortality 5.12%  Stroke 0.758%  Renal  "Failure 0.586%  Reoperation 3.72%  Prolonged Ventilation 1.92%  Deep Sternal Wound Infection 0.039%  Long Hospital Stay (>14 days) 1.71%  Short Hospital Stay (<6 days)* 65%  *higher values reflect a better outcome    Physical Exam:  Constitutional: alert and in no acute distress.   Eyes: no erythema, swelling or discharge from the eye .   ENT: no erythema, edema, exudate or lesions .   Neck: neck is supple, no JVD  Pulmonary: no increased work of breathing or signs of respiratory distress , lungs clear to auscultation. , normal percussion of chest  and chest palpation normal .   Cardiovascular: RRR, 3/6 ANNA MARIE RUSB,  no pitting edema, non-displaced PMI, no S3 or S4  Abdomen: abdomen non-tender, no masses  and no hepatomegaly .   Neurologic: non-focal neurologic examination.           10/30/2023     1:34 PM 10/30/2023     1:40 PM 4/25/2024     2:02 PM 5/29/2024    11:06 AM   Vitals   Systolic 158 152 148 146   Diastolic 102 94 86 74   Heart Rate 77 77 64 40   Height (in) 1.778 m (5' 10\") 1.778 m (5' 10\") 1.778 m (5' 10\") 1.829 m (6')   Weight (lb) 189 189 196 193.6   BMI 27.12 kg/m2 27.12 kg/m2 28.12 kg/m2 26.26 kg/m2   BSA (m2) 2.06 m2 2.06 m2 2.1 m2 2.11 m2   Visit Report Report Report Report Report    Report        Current Outpatient Medications   Medication Instructions    zolpidem (AMBIEN) 5 mg, Nightly PRN        Impression:   75 y/o gentleman with pmh of HTN, carpel tunnel syndrome, and skin CA (s/p Sx).    The patient has progressive NYHA class II HF symptoms in the setting of aortic stenosis    Plan:   CT TAVR  We will get LHC and TTE/PACHECO images from his cardiologist    Proceed with TAVR if anatomy feasible      We discussed all the risks associated with the procedure, including but not limited to stroke, MI, pericardial tamponade, vascular complications, infection and death were discussed with the patient. The risk of needing a permament pacemaker was also discussed in detail. The patient verbalized " understanding and decided to proceed with the procedure.     We will discuss this patient's case at our Valve Team meeting with representatives from [facility] Card[facility]. Our nurse navigators will contact patient with further diagnostic needs and formal plan.

## 2024-06-03 ENCOUNTER — CARDIOLOGY CONFERENCE (OUTPATIENT)
Dept: CARDIOLOGY | Facility: HOSPITAL | Age: 74
End: 2024-06-03
Payer: MEDICARE

## 2024-06-04 DIAGNOSIS — I35.0 NONRHEUMATIC AORTIC VALVE STENOSIS: Primary | ICD-10-CM

## 2024-06-05 ENCOUNTER — TELEPHONE (OUTPATIENT)
Dept: CARDIOLOGY | Facility: HOSPITAL | Age: 74
End: 2024-06-05
Payer: MEDICARE

## 2024-06-05 DIAGNOSIS — Z00.6 RESEARCH EXAM: Primary | ICD-10-CM

## 2024-06-05 NOTE — TELEPHONE ENCOUNTER
[facility]Q/WALK Questionnaire      1  Heart failure affects different people in different ways. Some feel shortness of breath while others feel fatigue. Please indicate how much you are limited by heart failure (shortness of breath or fatigue) in your ability to do the following activities over the past 2 weeks.     A.) Showering/bathing  5. Not at All  B.) Walking 1 block on level ground 3. Moderately  C.) Hurrying or Jogging   3. Moderately    2.  Over the past 2 weeks, how many times did you have swelling in your feet, ankles or legs when you woke up in the morning? 5. Never    3.  Over the past 2 weeks, on average, how many times has fatigue limited your ability to do what you wanted? 3. At least once a day    4.  Over the past 2 weeks, on average, how many times has shortness of breath limited your ability to do what you wanted? 4. 3 or more times a week but not every day    5.  Over the past 2 weeks, on average, how many times have you been forced to sleep sitting up in a chair or with at least 3 pillows to prop you up because of shortness of breath? Never    6. Over the past 2 weeks, how much has your heart failure limited your enjoyment of life? It has not limited my enjoyment of life    7. If you had to spend the rest of your life with your heart failure the way it is right now, how would you feel about this? 1. Not at all     8. How much does your heart failure affect your lifestyle? Please indicate how your heart failure may have limited yourparticipation in the following activities over the past 2 weeks    A.)  Hobbies, recreational activities  3. Moderately limited    B.) Working or doing household chores  3. Moderately limited    C.) Visiting family or friends out of your home  5. Did not limit at all      5 meter walk test 7.63 seconds

## 2024-06-10 DIAGNOSIS — I35.0 NONRHEUMATIC AORTIC VALVE STENOSIS: Primary | ICD-10-CM

## 2024-06-10 PROBLEM — Z95.2 S/P TAVR (TRANSCATHETER AORTIC VALVE REPLACEMENT): Status: ACTIVE | Noted: 2024-06-10

## 2024-06-10 NOTE — DISCHARGE INSTRUCTIONS
####  POST VALVE PROCEDURE DISCHARGE INSTRUCTIONS   ####    - Please weigh yourself daily (first thing in the morning) and record reading  - Please take and record your blood pressure 2 times a day (at least 60-90 mins AFTER you take your meds)  PLEASE HAVE THESE READINGS AVAILABLE DURING YOUR FOLLOW-UP APPOINTMENTS!!    - NO DRIVING OR LIFTING/PULLING/PUSHING GREATER THAN 10 POUNDS FOR 1 WEEK FROM YOUR PROCEDURE DATE.    - A lab requisition has been provided for you to draw a CBC, BMP, and PT/INR.  Please take this rec to your local lab to have your labs drawn between 4-7 days post discharge.     - You have been given the order requisition for the 1 month ECHO at the time of your discharge.  Please call and schedule this ECHO at your LOCAL center (no sooner than 23 days after your procedure date).    - You will have 3 appointments that are vital to your post procedure care, these will be scheduled for you IF YOU NEED TO CHANGE YOUR APPOINTMENT TIME/DATE, PLEASE CALL [phone]   - Please follow up with your PCP within 7-14 days of discharge  - You will follow up with your primary cardiologist in 6-10 weeks    **** No elective dental procedures or cleanings for 3 months post procedure - You will need dental prophylaxis (oral antibiotic) prior to dental work/cleanings for life *****    Please call the STRUCTURAL HEART TEAM LINE if you have any questions or concerns - [phone]     ****   CALL PROVIDER IF:   ****  - Breathing faster than normal.   - Breathing harder than normal or having retractions.   - Fever of 100.4 F (38 C) or higher.   - Chills.   - Drinking less than normal.   - Urinating less than normal, over 1 day.   - Acting very sleepy and difficult to awaken.   - Vomiting (throwing up) and not able to eat or drink for 12 hours.   - 3 or more loose, watery bowel movements in 24 hours (diarrhea).    -Any new concerning symptoms.    - If you develop difficulty breathing, rash, hives, severe nausea,  vomiting, light-headedness or any signs of infection, immediately contact your doctor and go to the nearest emergency room.      #####   MISC. HOME-GOING INFO   #####  - DO NOT drink any alcoholic drinks or take any non-prescriptive medications that contain alcohol for the first 24 hours.   - DO NOT make any important decisions for the first 24 hours.      ACTIVITY:  - You are advised to go directly home from the hospital.   - DO NOT lift anything heavier than 10 pounds for one week, this allows for proper healing of the groin.   - No excessive exercise or treadmill use for one week. You may walk and do stairs, slowly.   - No sexual activities for 24 hours after you arrive home.      WOUND CARE:  - If slight bleeding should occur at site, lie down and have someone apply firm pressure just above the puncture site for 5 minutes.  If it continues or is profuse, call 911. Always notify your doctor if bleeding occurs.   - Keep site clean and dry. Let air dry or you may use a simple bandaid.   - Gently cleanse the puncture site in your groin with soap and water only.   - You may experience some tenderness, bruising or minimal inflammation.  If you have any concerns, you may contact the Cath Lab or if any of these symptoms become excessive, contact your cardiologist or go to the emergency room.   - No tub baths, soaking, or swimming for one week.   - May shower the next day after your procedure.      DIET:  - You may resume your normal diet. However, be mindful of your sodium intake.  Ideally you should try to limit your daily intake of sodium to 2-3g a day      HEART FAILURE SPECIFIC INSTRUCTIONS:   - CALL 911 IF YOU HAVE ANY OF THE SIGNS AND SYMPTOMS OF HEART FAILURE:   1. Chest pain   2. Significant Shortness of breath   3. Fainting.   - Notify your physician immediately if you have shortness of breath; weight gain of 3 lbs. or more; fatigue and loss of energy; swelling of lower extremities or abdomen; dizziness or  fainting; change of appetite; and frequent coughing.   - Daily weight on the same scale, same time after voiding and before eating.   - Maintain daily weight log.

## 2024-06-14 ENCOUNTER — APPOINTMENT (OUTPATIENT)
Dept: CARDIOLOGY | Facility: CLINIC | Age: 74
End: 2024-06-14
Payer: MEDICARE

## 2024-06-18 ENCOUNTER — HOSPITAL ENCOUNTER (OUTPATIENT)
Dept: RADIOLOGY | Facility: HOSPITAL | Age: 74
DRG: 267 | End: 2024-06-18
Payer: MEDICARE

## 2024-06-21 ENCOUNTER — APPOINTMENT (OUTPATIENT)
Dept: CARDIOLOGY | Facility: HOSPITAL | Age: 74
DRG: 267 | End: 2024-06-21
Payer: MEDICARE

## 2024-06-21 ENCOUNTER — HOSPITAL ENCOUNTER (OUTPATIENT)
Dept: RADIOLOGY | Facility: HOSPITAL | Age: 74
Discharge: HOME | End: 2024-06-21
Payer: MEDICARE

## 2024-06-21 ENCOUNTER — APPOINTMENT (OUTPATIENT)
Dept: RADIOLOGY | Facility: HOSPITAL | Age: 74
DRG: 267 | End: 2024-06-21
Payer: MEDICARE

## 2024-06-21 ENCOUNTER — HOSPITAL ENCOUNTER (OUTPATIENT)
Facility: HOSPITAL | Age: 74
Discharge: HOME | DRG: 267 | End: 2024-06-22
Attending: INTERNAL MEDICINE | Admitting: INTERNAL MEDICINE
Payer: MEDICARE

## 2024-06-21 VITALS — WEIGHT: 194 LBS | BODY MASS INDEX: 26.28 KG/M2 | HEIGHT: 72 IN

## 2024-06-21 DIAGNOSIS — Z95.2 S/P TAVR (TRANSCATHETER AORTIC VALVE REPLACEMENT): Primary | ICD-10-CM

## 2024-06-21 DIAGNOSIS — Z98.890 OTHER SPECIFIED POSTPROCEDURAL STATES: ICD-10-CM

## 2024-06-21 DIAGNOSIS — I10 ESSENTIAL HYPERTENSION, BENIGN: ICD-10-CM

## 2024-06-21 DIAGNOSIS — I10 WHITE COAT SYNDROME WITH DIAGNOSIS OF HYPERTENSION: ICD-10-CM

## 2024-06-21 DIAGNOSIS — R06.02 SHORTNESS OF BREATH: ICD-10-CM

## 2024-06-21 DIAGNOSIS — R26.89 IMBALANCE: ICD-10-CM

## 2024-06-21 DIAGNOSIS — H90.3 BILATERAL SENSORINEURAL HEARING LOSS: ICD-10-CM

## 2024-06-21 DIAGNOSIS — I35.0 NONRHEUMATIC AORTIC VALVE STENOSIS: ICD-10-CM

## 2024-06-21 DIAGNOSIS — Z00.6 RESEARCH EXAM: ICD-10-CM

## 2024-06-21 DIAGNOSIS — Z78.9 NEVER SMOKED TOBACCO: ICD-10-CM

## 2024-06-21 LAB
ABO GROUP (TYPE) IN BLOOD: NORMAL
ACT BLD: 313 SEC (ref 83–199)
ANION GAP SERPL CALC-SCNC: 14 MMOL/L (ref 10–20)
ANTIBODY SCREEN: NORMAL
BASOPHILS # BLD AUTO: 0.03 X10*3/UL (ref 0–0.1)
BASOPHILS NFR BLD AUTO: 0.6 %
BODY SURFACE AREA: 2.11 M2
BUN SERPL-MCNC: 17 MG/DL (ref 6–23)
CALCIUM SERPL-MCNC: 8.9 MG/DL (ref 8.6–10.6)
CHLORIDE SERPL-SCNC: 103 MMOL/L (ref 98–107)
CO2 SERPL-SCNC: 25 MMOL/L (ref 21–32)
CREAT SERPL-MCNC: 0.83 MG/DL (ref 0.5–1.3)
EGFRCR SERPLBLD CKD-EPI 2021: >90 ML/MIN/1.73M*2
EOSINOPHIL # BLD AUTO: 0.05 X10*3/UL (ref 0–0.4)
EOSINOPHIL NFR BLD AUTO: 1.1 %
ERYTHROCYTE [DISTWIDTH] IN BLOOD BY AUTOMATED COUNT: 12.7 % (ref 11.5–14.5)
GLUCOSE BLD MANUAL STRIP-MCNC: 147 MG/DL (ref 74–99)
GLUCOSE BLD MANUAL STRIP-MCNC: 98 MG/DL (ref 74–99)
GLUCOSE SERPL-MCNC: 101 MG/DL (ref 74–99)
HCT VFR BLD AUTO: 41 % (ref 41–52)
HGB BLD-MCNC: 14.9 G/DL (ref 13.5–17.5)
IMM GRANULOCYTES # BLD AUTO: 0.03 X10*3/UL (ref 0–0.5)
IMM GRANULOCYTES NFR BLD AUTO: 0.6 % (ref 0–0.9)
LYMPHOCYTES # BLD AUTO: 1.11 X10*3/UL (ref 0.8–3)
LYMPHOCYTES NFR BLD AUTO: 23.7 %
MAGNESIUM SERPL-MCNC: 1.82 MG/DL (ref 1.6–2.4)
MCH RBC QN AUTO: 33 PG (ref 26–34)
MCHC RBC AUTO-ENTMCNC: 36.3 G/DL (ref 32–36)
MCV RBC AUTO: 91 FL (ref 80–100)
MONOCYTES # BLD AUTO: 0.36 X10*3/UL (ref 0.05–0.8)
MONOCYTES NFR BLD AUTO: 7.7 %
NEUTROPHILS # BLD AUTO: 3.1 X10*3/UL (ref 1.6–5.5)
NEUTROPHILS NFR BLD AUTO: 66.3 %
NRBC BLD-RTO: 0 /100 WBCS (ref 0–0)
PLATELET # BLD AUTO: 146 X10*3/UL (ref 150–450)
POTASSIUM SERPL-SCNC: 4 MMOL/L (ref 3.5–5.3)
RBC # BLD AUTO: 4.52 X10*6/UL (ref 4.5–5.9)
RH FACTOR (ANTIGEN D): NORMAL
SODIUM SERPL-SCNC: 138 MMOL/L (ref 136–145)
WBC # BLD AUTO: 4.7 X10*3/UL (ref 4.4–11.3)

## 2024-06-21 PROCEDURE — 2550000001 HC RX 255 CONTRASTS: Performed by: INTERNAL MEDICINE

## 2024-06-21 PROCEDURE — 93010 ELECTROCARDIOGRAM REPORT: CPT | Performed by: STUDENT IN AN ORGANIZED HEALTH CARE EDUCATION/TRAINING PROGRAM

## 2024-06-21 PROCEDURE — C1887 CATHETER, GUIDING: HCPCS | Performed by: INTERNAL MEDICINE

## 2024-06-21 PROCEDURE — 93325 DOPPLER ECHO COLOR FLOW MAPG: CPT | Performed by: INTERNAL MEDICINE

## 2024-06-21 PROCEDURE — 99152 MOD SED SAME PHYS/QHP 5/>YRS: CPT | Performed by: INTERNAL MEDICINE

## 2024-06-21 PROCEDURE — A9575 INJ GADOTERATE MEGLUMI 0.1ML: HCPCS | Performed by: INTERNAL MEDICINE

## 2024-06-21 PROCEDURE — C1760 CLOSURE DEV, VASC: HCPCS | Performed by: INTERNAL MEDICINE

## 2024-06-21 PROCEDURE — 93325 DOPPLER ECHO COLOR FLOW MAPG: CPT

## 2024-06-21 PROCEDURE — 93005 ELECTROCARDIOGRAM TRACING: CPT

## 2024-06-21 PROCEDURE — 85025 COMPLETE CBC W/AUTO DIFF WBC: CPT | Performed by: NURSE PRACTITIONER

## 2024-06-21 PROCEDURE — 71045 X-RAY EXAM CHEST 1 VIEW: CPT

## 2024-06-21 PROCEDURE — 71045 X-RAY EXAM CHEST 1 VIEW: CPT | Performed by: STUDENT IN AN ORGANIZED HEALTH CARE EDUCATION/TRAINING PROGRAM

## 2024-06-21 PROCEDURE — C1756 CATH, PACING, TRANSESOPH: HCPCS | Performed by: INTERNAL MEDICINE

## 2024-06-21 PROCEDURE — 83735 ASSAY OF MAGNESIUM: CPT | Performed by: NURSE PRACTITIONER

## 2024-06-21 PROCEDURE — 80048 BASIC METABOLIC PNL TOTAL CA: CPT | Performed by: NURSE PRACTITIONER

## 2024-06-21 PROCEDURE — 2500000001 HC RX 250 WO HCPCS SELF ADMINISTERED DRUGS (ALT 637 FOR MEDICARE OP): Performed by: NURSE PRACTITIONER

## 2024-06-21 PROCEDURE — C1769 GUIDE WIRE: HCPCS | Performed by: INTERNAL MEDICINE

## 2024-06-21 PROCEDURE — 33361 REPLACE AORTIC VALVE PERQ: CPT | Performed by: INTERNAL MEDICINE

## 2024-06-21 PROCEDURE — 7100000011 HC EXTENDED STAY RECOVERY HOURLY - NURSING UNIT

## 2024-06-21 PROCEDURE — 36415 COLL VENOUS BLD VENIPUNCTURE: CPT | Performed by: NURSE PRACTITIONER

## 2024-06-21 PROCEDURE — C1725 CATH, TRANSLUMIN NON-LASER: HCPCS | Performed by: INTERNAL MEDICINE

## 2024-06-21 PROCEDURE — 86901 BLOOD TYPING SEROLOGIC RH(D): CPT | Performed by: NURSE PRACTITIONER

## 2024-06-21 PROCEDURE — 93321 DOPPLER ECHO F-UP/LMTD STD: CPT | Performed by: INTERNAL MEDICINE

## 2024-06-21 PROCEDURE — 2500000005 HC RX 250 GENERAL PHARMACY W/O HCPCS: Performed by: INTERNAL MEDICINE

## 2024-06-21 PROCEDURE — C1894 INTRO/SHEATH, NON-LASER: HCPCS | Performed by: INTERNAL MEDICINE

## 2024-06-21 PROCEDURE — 82947 ASSAY GLUCOSE BLOOD QUANT: CPT

## 2024-06-21 PROCEDURE — G0269 OCCLUSIVE DEVICE IN VEIN ART: HCPCS | Mod: TC | Performed by: INTERNAL MEDICINE

## 2024-06-21 PROCEDURE — 75561 CARDIAC MRI FOR MORPH W/DYE: CPT

## 2024-06-21 PROCEDURE — 2720000007 HC OR 272 NO HCPCS: Performed by: INTERNAL MEDICINE

## 2024-06-21 PROCEDURE — 2500000004 HC RX 250 GENERAL PHARMACY W/ HCPCS (ALT 636 FOR OP/ED): Performed by: NURSE PRACTITIONER

## 2024-06-21 PROCEDURE — 2500000005 HC RX 250 GENERAL PHARMACY W/O HCPCS: Performed by: NURSE PRACTITIONER

## 2024-06-21 PROCEDURE — 99153 MOD SED SAME PHYS/QHP EA: CPT | Performed by: INTERNAL MEDICINE

## 2024-06-21 PROCEDURE — 5A2204Z RESTORATION OF CARDIAC RHYTHM, SINGLE: ICD-10-PCS | Performed by: INTERNAL MEDICINE

## 2024-06-21 PROCEDURE — 85347 COAGULATION TIME ACTIVATED: CPT | Mod: 91 | Performed by: INTERNAL MEDICINE

## 2024-06-21 PROCEDURE — 1200000002 HC GENERAL ROOM WITH TELEMETRY DAILY

## 2024-06-21 PROCEDURE — 85347 COAGULATION TIME ACTIVATED: CPT

## 2024-06-21 PROCEDURE — 93308 TTE F-UP OR LMTD: CPT | Performed by: INTERNAL MEDICINE

## 2024-06-21 PROCEDURE — C1889 IMPLANT/INSERT DEVICE, NOC: HCPCS | Performed by: INTERNAL MEDICINE

## 2024-06-21 PROCEDURE — 2780000003 HC OR 278 NO HCPCS: Performed by: INTERNAL MEDICINE

## 2024-06-21 PROCEDURE — 02RF38Z REPLACEMENT OF AORTIC VALVE WITH ZOOPLASTIC TISSUE, PERCUTANEOUS APPROACH: ICD-10-PCS | Performed by: INTERNAL MEDICINE

## 2024-06-21 PROCEDURE — 2500000004 HC RX 250 GENERAL PHARMACY W/ HCPCS (ALT 636 FOR OP/ED): Performed by: INTERNAL MEDICINE

## 2024-06-21 DEVICE — DELIV SYS D-EVOLUTFX-34
Type: IMPLANTABLE DEVICE | Site: HEART | Status: NON-FUNCTIONAL
Brand: EVOLUT™ FX

## 2024-06-21 DEVICE — VALVE, AORTIC, 34MM, EVOLUT FX  TRANSCATHETER: Type: IMPLANTABLE DEVICE | Site: HEART | Status: FUNCTIONAL

## 2024-06-21 RX ORDER — MIDAZOLAM HYDROCHLORIDE 1 MG/ML
INJECTION, SOLUTION INTRAMUSCULAR; INTRAVENOUS AS NEEDED
Status: DISCONTINUED | OUTPATIENT
Start: 2024-06-21 | End: 2024-06-21 | Stop reason: HOSPADM

## 2024-06-21 RX ORDER — ACETAMINOPHEN 325 MG/1
650 TABLET ORAL EVERY 6 HOURS PRN
Status: DISCONTINUED | OUTPATIENT
Start: 2024-06-21 | End: 2024-06-22 | Stop reason: HOSPADM

## 2024-06-21 RX ORDER — PANTOPRAZOLE SODIUM 40 MG/1
40 TABLET, DELAYED RELEASE ORAL
Status: DISCONTINUED | OUTPATIENT
Start: 2024-06-22 | End: 2024-06-22 | Stop reason: HOSPADM

## 2024-06-21 RX ORDER — PANTOPRAZOLE SODIUM 40 MG/10ML
40 INJECTION, POWDER, LYOPHILIZED, FOR SOLUTION INTRAVENOUS
Status: DISCONTINUED | OUTPATIENT
Start: 2024-06-22 | End: 2024-06-22 | Stop reason: HOSPADM

## 2024-06-21 RX ORDER — ONDANSETRON HYDROCHLORIDE 2 MG/ML
4 INJECTION, SOLUTION INTRAVENOUS EVERY 8 HOURS PRN
Status: DISCONTINUED | OUTPATIENT
Start: 2024-06-21 | End: 2024-06-22 | Stop reason: HOSPADM

## 2024-06-21 RX ORDER — ONDANSETRON 4 MG/1
4 TABLET, FILM COATED ORAL EVERY 8 HOURS PRN
Status: DISCONTINUED | OUTPATIENT
Start: 2024-06-21 | End: 2024-06-22 | Stop reason: HOSPADM

## 2024-06-21 RX ORDER — LIDOCAINE HYDROCHLORIDE 10 MG/ML
INJECTION, SOLUTION EPIDURAL; INFILTRATION; INTRACAUDAL; PERINEURAL AS NEEDED
Status: DISCONTINUED | OUTPATIENT
Start: 2024-06-21 | End: 2024-06-21 | Stop reason: HOSPADM

## 2024-06-21 RX ORDER — TRAMADOL HYDROCHLORIDE 50 MG/1
50 TABLET ORAL EVERY 6 HOURS PRN
Status: DISCONTINUED | OUTPATIENT
Start: 2024-06-21 | End: 2024-06-22 | Stop reason: HOSPADM

## 2024-06-21 RX ORDER — LIDOCAINE HYDROCHLORIDE 20 MG/ML
INJECTION, SOLUTION INFILTRATION; PERINEURAL AS NEEDED
Status: DISCONTINUED | OUTPATIENT
Start: 2024-06-21 | End: 2024-06-21 | Stop reason: HOSPADM

## 2024-06-21 RX ORDER — PROTAMINE SULFATE 10 MG/ML
INJECTION, SOLUTION INTRAVENOUS CONTINUOUS PRN
Status: COMPLETED | OUTPATIENT
Start: 2024-06-21 | End: 2024-06-21

## 2024-06-21 RX ORDER — SENNOSIDES 8.6 MG/1
2 TABLET ORAL 2 TIMES DAILY
Status: DISCONTINUED | OUTPATIENT
Start: 2024-06-21 | End: 2024-06-22 | Stop reason: HOSPADM

## 2024-06-21 RX ORDER — SODIUM CHLORIDE, SODIUM LACTATE, POTASSIUM CHLORIDE, CALCIUM CHLORIDE 600; 310; 30; 20 MG/100ML; MG/100ML; MG/100ML; MG/100ML
75 INJECTION, SOLUTION INTRAVENOUS CONTINUOUS
Status: ACTIVE | OUTPATIENT
Start: 2024-06-21 | End: 2024-06-21

## 2024-06-21 RX ORDER — HEPARIN SODIUM 1000 [USP'U]/ML
INJECTION, SOLUTION INTRAVENOUS; SUBCUTANEOUS AS NEEDED
Status: DISCONTINUED | OUTPATIENT
Start: 2024-06-21 | End: 2024-06-21 | Stop reason: HOSPADM

## 2024-06-21 RX ORDER — FENTANYL CITRATE 50 UG/ML
INJECTION, SOLUTION INTRAMUSCULAR; INTRAVENOUS AS NEEDED
Status: DISCONTINUED | OUTPATIENT
Start: 2024-06-21 | End: 2024-06-21 | Stop reason: HOSPADM

## 2024-06-21 RX ORDER — GADOTERATE MEGLUMINE 376.9 MG/ML
35 INJECTION INTRAVENOUS
Status: COMPLETED | OUTPATIENT
Start: 2024-06-21 | End: 2024-06-21

## 2024-06-21 RX ORDER — NAPROXEN SODIUM 220 MG/1
81 TABLET, FILM COATED ORAL DAILY
Status: DISCONTINUED | OUTPATIENT
Start: 2024-06-21 | End: 2024-06-22 | Stop reason: HOSPADM

## 2024-06-21 RX ORDER — VANCOMYCIN HYDROCHLORIDE 1 G/200ML
1000 INJECTION, SOLUTION INTRAVENOUS ONCE
Status: COMPLETED | OUTPATIENT
Start: 2024-06-21 | End: 2024-06-21

## 2024-06-21 SDOH — SOCIAL STABILITY: SOCIAL INSECURITY: WERE YOU ABLE TO COMPLETE ALL THE BEHAVIORAL HEALTH SCREENINGS?: YES

## 2024-06-21 SDOH — SOCIAL STABILITY: SOCIAL INSECURITY: HAS ANYONE EVER THREATENED TO HURT YOUR FAMILY OR YOUR PETS?: NO

## 2024-06-21 SDOH — SOCIAL STABILITY: SOCIAL INSECURITY: HAVE YOU HAD ANY THOUGHTS OF HARMING ANYONE ELSE?: NO

## 2024-06-21 SDOH — SOCIAL STABILITY: SOCIAL INSECURITY: ARE YOU OR HAVE YOU BEEN THREATENED OR ABUSED PHYSICALLY, EMOTIONALLY, OR SEXUALLY BY ANYONE?: NO

## 2024-06-21 SDOH — SOCIAL STABILITY: SOCIAL INSECURITY: ABUSE: ADULT

## 2024-06-21 SDOH — SOCIAL STABILITY: SOCIAL INSECURITY: DOES ANYONE TRY TO KEEP YOU FROM HAVING/CONTACTING OTHER FRIENDS OR DOING THINGS OUTSIDE YOUR HOME?: NO

## 2024-06-21 SDOH — SOCIAL STABILITY: SOCIAL INSECURITY: HAVE YOU HAD THOUGHTS OF HARMING ANYONE ELSE?: NO

## 2024-06-21 SDOH — SOCIAL STABILITY: SOCIAL INSECURITY: ARE THERE ANY APPARENT SIGNS OF INJURIES/BEHAVIORS THAT COULD BE RELATED TO ABUSE/NEGLECT?: NO

## 2024-06-21 SDOH — SOCIAL STABILITY: SOCIAL INSECURITY: DO YOU FEEL UNSAFE GOING BACK TO THE PLACE WHERE YOU ARE LIVING?: NO

## 2024-06-21 SDOH — SOCIAL STABILITY: SOCIAL INSECURITY: DO YOU FEEL ANYONE HAS EXPLOITED OR TAKEN ADVANTAGE OF YOU FINANCIALLY OR OF YOUR PERSONAL PROPERTY?: NO

## 2024-06-21 ASSESSMENT — PAIN - FUNCTIONAL ASSESSMENT
PAIN_FUNCTIONAL_ASSESSMENT: 0-10
PAIN_FUNCTIONAL_ASSESSMENT: 0-10

## 2024-06-21 ASSESSMENT — COGNITIVE AND FUNCTIONAL STATUS - GENERAL
PATIENT BASELINE BEDBOUND: NO
DAILY ACTIVITIY SCORE: 24
MOBILITY SCORE: 24

## 2024-06-21 ASSESSMENT — ACTIVITIES OF DAILY LIVING (ADL)
JUDGMENT_ADEQUATE_SAFELY_COMPLETE_DAILY_ACTIVITIES: YES
HEARING - LEFT EAR: HEARING AID
TOILETING: INDEPENDENT
ADEQUATE_TO_COMPLETE_ADL: YES
BATHING: INDEPENDENT
FEEDING YOURSELF: INDEPENDENT
LACK_OF_TRANSPORTATION: PATIENT DECLINED
ASSISTIVE_DEVICE: HEARING AID - LEFT;HEARING AID - RIGHT
DRESSING YOURSELF: INDEPENDENT
PATIENT'S MEMORY ADEQUATE TO SAFELY COMPLETE DAILY ACTIVITIES?: YES
GROOMING: INDEPENDENT
WALKS IN HOME: INDEPENDENT
HEARING - RIGHT EAR: HEARING AID

## 2024-06-21 ASSESSMENT — PATIENT HEALTH QUESTIONNAIRE - PHQ9
2. FEELING DOWN, DEPRESSED OR HOPELESS: NOT AT ALL
1. LITTLE INTEREST OR PLEASURE IN DOING THINGS: NOT AT ALL
SUM OF ALL RESPONSES TO PHQ9 QUESTIONS 1 & 2: 0

## 2024-06-21 ASSESSMENT — LIFESTYLE VARIABLES
HOW OFTEN DO YOU HAVE A DRINK CONTAINING ALCOHOL: 4 OR MORE TIMES A WEEK
AUDIT-C TOTAL SCORE: 6
HOW OFTEN DO YOU HAVE 6 OR MORE DRINKS ON ONE OCCASION: LESS THAN MONTHLY
AUDIT-C TOTAL SCORE: 6
HOW MANY STANDARD DRINKS CONTAINING ALCOHOL DO YOU HAVE ON A TYPICAL DAY: 3 OR 4
SKIP TO QUESTIONS 9-10: 0

## 2024-06-21 ASSESSMENT — COLUMBIA-SUICIDE SEVERITY RATING SCALE - C-SSRS
1. IN THE PAST MONTH, HAVE YOU WISHED YOU WERE DEAD OR WISHED YOU COULD GO TO SLEEP AND NOT WAKE UP?: NO
2. HAVE YOU ACTUALLY HAD ANY THOUGHTS OF KILLING YOURSELF?: NO
6. HAVE YOU EVER DONE ANYTHING, STARTED TO DO ANYTHING, OR PREPARED TO DO ANYTHING TO END YOUR LIFE?: NO

## 2024-06-21 ASSESSMENT — PAIN SCALES - GENERAL
PAINLEVEL_OUTOF10: 0 - NO PAIN
PAINLEVEL_OUTOF10: 0 - NO PAIN

## 2024-06-21 NOTE — PROGRESS NOTES
"Pharmacy Medication History Review    Carl Sherman is a 74 y.o. male admitted for Nonrheumatic aortic valve stenosis. Pharmacy reviewed the patient's abryd-vb-fstotgeik medications and allergies for accuracy.    The list below reflects the updated PTA list.   Comments regarding how patient may be taking medications differently can be found in the Admit Orders Activity  Prior to Admission Medications   Prescriptions Last Dose Informant Patient Reported?   zolpidem (Ambien) 10 mg tablet 6/20/2024 Self Yes   Sig: Take 0.5 tablets (5 mg) by mouth as needed at bedtime for sleep.      Facility-Administered Medications: None        The list below reflects the updated allergy list. Please review each documented allergy for additional clarification and justification.  Allergies  Reviewed by Ricardo Isaacs RPh on 6/21/2024        Severity Reactions Comments    Cephalexin High Anaphylaxis           M2B service not offered prior to surgery, please reassess prior to patient discharge if Meds to Beds is desired.  Local Pharmacy: [facility]      Sources:   Pt interview  Dispense hx   [facility]    Additional Comments:  None       Ricardo Isaacs PharmD  Transitions of Care Pharmacist  06/21/24     Secure Chat preferred   If no response call [phone] or Vocera \"Med Rec\"   "

## 2024-06-21 NOTE — H&P
Cardio: Benny        HPI:   75 y/o gentleman with pmh of HTN, carpel tunnel syndrome, and skin CA (s/p Sx).     The patient has progressive BERGERON and CP for the last several months and have been progressive. He also reports significant fatigue. He had a TTE in 05/2024 which showed EF 60-65%, calcified AV, PV 3.8, MF 35 mmHg, MELANIE 0.66 (we dont have images). The patient also had a PACHECO which showed MG 44 mmHg, MELANIE 0.7 (we dont have images). The patient also had LHC which showed non-obs CAD.     The patient is very active and continues to work full time.  He is independent in ADLs and IADLs. He doesn't use a aid to walk.     He comes for elective TAVR        ROS:     Constitutional: fatigue  Eyes: no acute eye problems, no blurred vision, no diplopia, no eye pain  ENT: no nosebleeds, no acute hearing loss, no earache, no sore throat  Cardiovascular: dyspnea on exertion, no chest pain  Respiratory: no chronic cough, not coughing up sputum, no wheezing that is consistent with asthma  Gastrointestinal: no acute bowel complaints  Musculoskeletal: no acute arthralgias, no acute myalgias, no acute joint swelling  Skin: no skin rashes, no change in skin color and pigmentation, no skin lesions and no skin lumps.   Neurological: no headaches, no dizziness, no tingling, no fainting and no limb weakness.   Psychiatric:  no suicidal ideation, no confusion, no personality change and no emotional problems.   Hematologic/Lymphatic: no bleeding issues.   All other systems have been reviewed and are negative for complaint.         TAVR Workup:   - NYHA: II  - Frailty: 0/5  - EKG: no recent in the system  - TTE: 05/2024 which showed EF 60-65%, calcified AV, PV 3.8, MF 35 mmHg, MELANIE 0.66 (we dont have images)  - CT TAVR: pending  - LHC: non osb CAD  - dental clearance: regular visits -- no reported issues  - STS  Procedure Type: Isolated AVR  PERIOPERATIVE OUTCOME     ESTIMATE %  Operative Mortality     0.964%  Morbidity & Mortality  5.12%  Stroke  0.758%  Renal Failure    0.586%  Reoperation     3.72%  Prolonged Ventilation 1.92%  Deep Sternal Wound Infection            0.039%  Long Hospital Stay (>14 days)            1.71%  Short Hospital Stay (<6 days)*            65%  *higher values reflect a better outcome     Physical Exam:  Constitutional: alert and in no acute distress.   Eyes: no erythema, swelling or discharge from the eye .   ENT: no erythema, edema, exudate or lesions .   Neck: neck is supple, no JVD  Pulmonary: no increased work of breathing or signs of respiratory distress , lungs clear to auscultation. , normal percussion of chest  and chest palpation normal .   Cardiovascular: RRR, 3/6 ANNA MARIE RUSB,  no pitting edema, non-displaced PMI, no S3 or S4  Abdomen: abdomen non-tender, no masses  and no hepatomegaly .   Neurologic: non-focal neurologic examination.                Current Outpatient Medications   Medication Instructions    zolpidem (AMBIEN) 5 mg, Nightly PRN         Impression:   73 y/o gentleman with pmh of HTN, carpel tunnel syndrome, and skin CA (s/p Sx).     The patient has progressive NYHA class II HF symptoms in the setting of aortic stenosis     Plan:   Proceed with TAVR        We discussed all the risks associated with the procedure, including but not limited to stroke, MI, pericardial tamponade, vascular complications, infection and death were discussed with the patient. The risk of needing a permament pacemaker was also discussed in detail. The patient verbalized understanding and decided to proceed with the procedure.      We will discuss this patient's case at our Valve Team meeting with representatives from Structural Heart and Cardiac Surgery. Our nurse navigators will contact patient with further diagnostic needs and formal plan.

## 2024-06-21 NOTE — OP NOTE
TAVR (Transcatheter AV Replacement), TVP for TAVR Operative Note     Date: 2024  OR Location: [address] Cardiac Cath Lab    Name: Carl Sherman YOB: 1950, Age: 74 y.o., MRN: [id number], Sex: male    Date of Procedure: 2024    Pre-operative Diagnosis:   1. Symptomatic severe aortic stenosis    Post-Operative Diagnosis:   1. Symptomatic severe aortic stenosis    Procedure:   1. Transcatheter Aortic Valve Replacement (34 mm Medtronic Evolut FX SN: [id number])   2. Percutaneous placement of right ventricular pacemaker.  3. Left heart catheterization including left ventricular end diastolic pressure (LVEDP)  4. Percutaneous pre-closure of the right and left common femoral artery  5. Aortic root angiography  6. Percutaneous balloon aortic valvuloplasty (23 mm True Balloon)    Surgeon:   Alon Vazquez MD    Cardiologist:   Devon Walter MD; David Agustin MD    Indications:     Mr. Carl Sherman is an 74 y.o. male, who is a patient of Dr.Gloria ANJANA Jang DO   Referring cardiologist is Lizette Sullivan.  The patient was referred to the structural heart service and evaluated thoroughly, and presented at our multidisciplinary meeting.  The consensus of the group was to offer a TAVR procedure based on anatomy and symptoms.  A transcatheter valve was implanted without any significant difficulties or concerns.    Intra-operative findings:     Severe calcification of the aortic valve leaflets.  Trileaflet valve.    Implant height at 1 mm on the non-coronary annulus and 1 mm on the left coronary annulus, there was commisural alignment  Post-deployment transthoracic echocardiography showed that there was trivial paravalvular leak, and the mean gradient across the valve was 4 mmHg.  There was no heart block throughout the case.      Procedure details:    After informed consent was obtained, and all questions asked and answered to the patient's satisfaction, they were brought back to the cardiology  catheterization laboratory and placed on the Cath Lab table.  A timeout was performed with the correct patient, correct procedure, the correct laterality, timing and dosage of antibiotics, the availability of blood products, and all correct equipment was verified as being present prior to beginning the case.  The patient was prepped and draped in the appropriate fashion.    Ultrasound guidance was utilized to place a 7 Irish locking sheath in the left common femoral vein.  This was done using a modified Seldinger technique.  A balloon tipped pacing wire was then floated through to the right ventricle and locked into place, adequate pacing thresholds were noted. Next an 6 Fr sheath was placed in the left common femoral artery using a modified Seldinger technique.  A 6 Fr pigtail was placed over a wire into the non-coronary sinus. Next attention was placed to the right common femoral artery, and using fluoroscopic guidance and a micropuncture needle, it was accessed using modified Seldinger technique.  This was exchanged for a 7 Irish dilator, and then 2 Pro-glide Perclose sutures were placed.  Next the artery was dilated, and a 18 Irish  sheath was placed in the primary access artery, under direct fluoroscopic guidance using a Supercore wire.    The patient was then systemically heparinized. An 180 cm J-wire was then used to place an JR-4 catheter in the ascending aorta and a floppy tip straight wire was utilized to cross the aortic valve and JR-4 catheter was moved into the left ventricle.  An 180 cm exchange length J-wire was then utilized over the through the JR-4 catheter, this was exchanged for a pigtail catheter, hemodynamics were measured, including LVEDP. A Safari wire was placed through the pigtail catheter into the left ventricle.  The Evolut valve was then verified under fluoroscopy to ensure proper loading.        A balloon valvuloplasty was performed using a 23 True balloon, while pacing at 180 BPM.   The balloon was then removed and the wire maintained into the LV position.    Next, the sheath was removed from the primary access artery, over the wire, and the delivery system and valve inserted into the primary access artery.  The valve was then advanced on the delivery system across the arch and across the native aortic valve.  The valve was placed in the appropriate position and slowly deployed while pacing at 120-160 BPM.  After the valve was deployed at 80%, we paused to evaluate the location and position.  As we positive pacer we identified that he was in ventricular tachycardia, which had transition from pacing at 180 bpm to ventricular tachycardia.  He was shocked once at 200 J and regained sinus rhythm immediately.  No compressions were needed.  We confirmed cusp overlap in standard fashion, and after general consensus agreement, proceeded to deploy the valve.  While pacing at 120-160 BP, the valve was fully deployed and the paddles released.  The nose cone was then retracted into the ascending aorta, and the valve was assessed with echo.  The delivery system was recaptured.  The valve was crossed with a pigtail, and hemodynamics, including LVEDP were measured.      A supracore wire was advanced into the delivery system, which was then removed from the primary access artery over the supracore wire, and the the 2 previously placed proglide Perclose sutures were cinched and locked, with good hemostasis and a good distal pulse.  The secondary access arterial site was also closed with a Perclose device, with good hemostasis and a good distal pulse.  Patient then was taken to the floor in stable condition.  Sponge and instrument counts were correct.  As the attending surgeon, I was present or immediately available for all aspects of the operation.

## 2024-06-21 NOTE — PRE-SEDATION DOCUMENTATION
Sedation Plan    ASA 3     Mallampati class: III.    Risks, benefits, and alternatives discussed with patient.      
none

## 2024-06-21 NOTE — Clinical Note
Valve partially deployed at while pacing up to 180 BPM. Pacer off with Pt remaining in VT one shock deployed synchronous 200J, returned to SR

## 2024-06-22 ENCOUNTER — APPOINTMENT (OUTPATIENT)
Dept: CARDIOLOGY | Facility: HOSPITAL | Age: 74
DRG: 267 | End: 2024-06-22
Payer: MEDICARE

## 2024-06-22 VITALS
WEIGHT: 194 LBS | SYSTOLIC BLOOD PRESSURE: 131 MMHG | OXYGEN SATURATION: 96 % | RESPIRATION RATE: 18 BRPM | HEART RATE: 69 BPM | DIASTOLIC BLOOD PRESSURE: 82 MMHG | TEMPERATURE: 97.5 F | BODY MASS INDEX: 26.28 KG/M2 | HEIGHT: 72 IN

## 2024-06-22 LAB
ANION GAP SERPL CALC-SCNC: 15 MMOL/L (ref 10–20)
AORTIC VALVE MEAN GRADIENT: 5 MMHG
AORTIC VALVE PEAK VELOCITY: 1.68 M/S
ATRIAL RATE: 72 BPM
ATRIAL RATE: 74 BPM
AV PEAK GRADIENT: 11.3 MMHG
BASOPHILS # BLD AUTO: 0.02 X10*3/UL (ref 0–0.1)
BASOPHILS NFR BLD AUTO: 0.3 %
BUN SERPL-MCNC: 14 MG/DL (ref 6–23)
CALCIUM SERPL-MCNC: 8.7 MG/DL (ref 8.6–10.6)
CHLORIDE SERPL-SCNC: 103 MMOL/L (ref 98–107)
CO2 SERPL-SCNC: 22 MMOL/L (ref 21–32)
CREAT SERPL-MCNC: 0.79 MG/DL (ref 0.5–1.3)
EGFRCR SERPLBLD CKD-EPI 2021: >90 ML/MIN/1.73M*2
EJECTION FRACTION APICAL 4 CHAMBER: 70.8
EJECTION FRACTION: 72 %
EOSINOPHIL # BLD AUTO: 0.03 X10*3/UL (ref 0–0.4)
EOSINOPHIL NFR BLD AUTO: 0.4 %
ERYTHROCYTE [DISTWIDTH] IN BLOOD BY AUTOMATED COUNT: 13 % (ref 11.5–14.5)
GLUCOSE BLD MANUAL STRIP-MCNC: 96 MG/DL (ref 74–99)
GLUCOSE SERPL-MCNC: 113 MG/DL (ref 74–99)
HCT VFR BLD AUTO: 42.6 % (ref 41–52)
HGB BLD-MCNC: 15.3 G/DL (ref 13.5–17.5)
IMM GRANULOCYTES # BLD AUTO: 0.04 X10*3/UL (ref 0–0.5)
IMM GRANULOCYTES NFR BLD AUTO: 0.5 % (ref 0–0.9)
LYMPHOCYTES # BLD AUTO: 0.73 X10*3/UL (ref 0.8–3)
LYMPHOCYTES NFR BLD AUTO: 9.4 %
MAGNESIUM SERPL-MCNC: 1.99 MG/DL (ref 1.6–2.4)
MCH RBC QN AUTO: 33.2 PG (ref 26–34)
MCHC RBC AUTO-ENTMCNC: 35.9 G/DL (ref 32–36)
MCV RBC AUTO: 92 FL (ref 80–100)
MONOCYTES # BLD AUTO: 0.63 X10*3/UL (ref 0.05–0.8)
MONOCYTES NFR BLD AUTO: 8.2 %
NEUTROPHILS # BLD AUTO: 6.28 X10*3/UL (ref 1.6–5.5)
NEUTROPHILS NFR BLD AUTO: 81.2 %
NRBC BLD-RTO: 0 /100 WBCS (ref 0–0)
P AXIS: 61 DEGREES
P AXIS: 64 DEGREES
P OFFSET: 172 MS
P OFFSET: 174 MS
P ONSET: 108 MS
P ONSET: 110 MS
PLATELET # BLD AUTO: 147 X10*3/UL (ref 150–450)
POTASSIUM SERPL-SCNC: 4 MMOL/L (ref 3.5–5.3)
PR INTERVAL: 202 MS
PR INTERVAL: 220 MS
Q ONSET: 211 MS
Q ONSET: 218 MS
QRS COUNT: 12 BEATS
QRS COUNT: 12 BEATS
QRS DURATION: 106 MS
QRS DURATION: 108 MS
QT INTERVAL: 396 MS
QT INTERVAL: 400 MS
QTC CALCULATION(BAZETT): 438 MS
QTC CALCULATION(BAZETT): 439 MS
QTC FREDERICIA: 425 MS
QTC FREDERICIA: 425 MS
R AXIS: 0 DEGREES
R AXIS: 5 DEGREES
RBC # BLD AUTO: 4.61 X10*6/UL (ref 4.5–5.9)
RIGHT VENTRICLE PEAK SYSTOLIC PRESSURE: 20.3 MMHG
SODIUM SERPL-SCNC: 136 MMOL/L (ref 136–145)
T AXIS: 120 DEGREES
T AXIS: 89 DEGREES
T OFFSET: 411 MS
T OFFSET: 416 MS
VENTRICULAR RATE: 72 BPM
VENTRICULAR RATE: 74 BPM
WBC # BLD AUTO: 7.7 X10*3/UL (ref 4.4–11.3)

## 2024-06-22 PROCEDURE — 85025 COMPLETE CBC W/AUTO DIFF WBC: CPT | Performed by: NURSE PRACTITIONER

## 2024-06-22 PROCEDURE — 7100000011 HC EXTENDED STAY RECOVERY HOURLY - NURSING UNIT

## 2024-06-22 PROCEDURE — 93308 TTE F-UP OR LMTD: CPT

## 2024-06-22 PROCEDURE — 93308 TTE F-UP OR LMTD: CPT | Performed by: INTERNAL MEDICINE

## 2024-06-22 PROCEDURE — 83735 ASSAY OF MAGNESIUM: CPT | Performed by: NURSE PRACTITIONER

## 2024-06-22 PROCEDURE — 80048 BASIC METABOLIC PNL TOTAL CA: CPT | Performed by: NURSE PRACTITIONER

## 2024-06-22 PROCEDURE — 93005 ELECTROCARDIOGRAM TRACING: CPT

## 2024-06-22 PROCEDURE — 2500000001 HC RX 250 WO HCPCS SELF ADMINISTERED DRUGS (ALT 637 FOR MEDICARE OP): Performed by: NURSE PRACTITIONER

## 2024-06-22 PROCEDURE — 36415 COLL VENOUS BLD VENIPUNCTURE: CPT | Performed by: NURSE PRACTITIONER

## 2024-06-22 PROCEDURE — 2500000005 HC RX 250 GENERAL PHARMACY W/O HCPCS: Performed by: NURSE PRACTITIONER

## 2024-06-22 PROCEDURE — 82947 ASSAY GLUCOSE BLOOD QUANT: CPT

## 2024-06-22 RX ORDER — NAPROXEN SODIUM 220 MG/1
81 TABLET, FILM COATED ORAL DAILY
Qty: 180 TABLET | Refills: 0 | Status: SHIPPED | OUTPATIENT
Start: 2024-06-22

## 2024-06-22 ASSESSMENT — COGNITIVE AND FUNCTIONAL STATUS - GENERAL
DAILY ACTIVITIY SCORE: 24
MOBILITY SCORE: 24

## 2024-06-22 ASSESSMENT — PAIN SCALES - GENERAL: PAINLEVEL_OUTOF10: 0 - NO PAIN

## 2024-06-22 NOTE — POST-PROCEDURE NOTE
Physician Transition of Care Summary  Invasive Cardiovascular Lab    Procedure Date: 6/21/2024  Attending: Panel 1:     * Alejandro Moser - Primary  Panel 2:     * Alon Vazquez - Primary  Resident/Fellow/Other Assistant: Surgeons and Role:  Panel 1:     * David Agustin MD - Fellow    Indications:   Pre-op Diagnosis     * Nonrheumatic aortic valve stenosis [I35.0]    Post-procedure diagnosis:   Post-op Diagnosis     * Nonrheumatic aortic valve stenosis [I35.0]    Procedure(s):   TVP for TAVR    TAVR (Transcatheter AV Replacement)  94584 - MS REPLACE AORTIC VALVE PERQ FEMORAL ARTRY APPROACH    TAVR (Transcatheter AV Replacement)    MS REPLACE AORTIC VALVE PERQ FEMORAL ARTRY APPROACH [75097]        Description of the Procedure:   Indication: Severe Aortic Stenosis    Valve used: 34 mm Evolut FX+    Access  Primary: right CFA s/p 2 proglide  Secondary: left CFA 6 Hebrew s/p Angioseal    TVP: L CFV,  7 Hebrew, removed in the cath lab.       Post gradient TTE: 4  No PVL  No effusion    Conclusion  Monitor tele  Monitor access sites for bleeding  TTE tomorrow  Bed rest  Structural team will continue to follow.   page structural team for any concerning clinical events.        Stents/Implants:   Implants       Heart Valve Repair    Delivery System, Evolut Fx, 34mm - [id number] - Used, Not Implanted        Inventory item: DELIVERY SYSTEM, EVOLUT FX, 34MM Model/Cat number: [id number]    : [employer] Lot number: [id number]    Device identifier: [id number]        As of 6/21/2024       Status: Used, Not Implanted                      Valve, Aortic, 34mm, Evolut Fx Transcatheter - [id number] - [id number] - Implanted        Inventory item: VALVE, AORTIC, 34MM, EVOLUT FX  TRANSCATHETER Model/Cat number: [id number]    Serial number: [id number] : [employer]    Lot number: [id number]        As of 6/21/2024       Status: Implanted                      Type Not Specified    5.0mm X 7.5mm  L-Aneurysm-Clip System, 90deg Angled, 6,1mm Maximum Opening, Permanent, Fenestrated, Titanium - Implanted        Model/Cat number: 45.654 Serial number: [id number]    Lot number: [id number] Device identifier: [id number]      As of 6/21/2024       Status: Implanted                                Estimated Blood Loss:   50 mL    Anesthesia: Moderate Sedation Anesthesia Staff: No anesthesia staff entered.    Any Specimen(s) Removed:   Order Name Source Comment Collection Info Order Time   TYPE AND SCREEN Blood, Venous  Collected By: Esperanza Queen RN 6/21/2024 12:24 PM     Release result to MyChart   Immediate        BASIC METABOLIC PANEL Blood, Venous  Collected By: Ethan Dejesus RN 6/21/2024  5:33 PM     Release result to MyChart   Immediate        MAGNESIUM Blood, Venous  Collected By: Ethan Dejesus RN 6/21/2024  5:33 PM     Release result to MyChart   Immediate        CBC WITH AUTO DIFFERENTIAL Blood, Venous  Collected By: Ethan Dejesus RN 6/21/2024  5:33 PM     Release result to MyChart   Immediate        CBC WITH AUTO DIFFERENTIAL Blood, Venous   6/21/2024  7:01 PM     Release result to MyChart   Immediate        BASIC METABOLIC PANEL Blood, Venous   6/21/2024  7:01 PM     Release result to MyChart   Immediate        MAGNESIUM Blood, Venous   6/21/2024  7:01 PM     Release result to MyChart   Immediate                  Electronically signed by: David Agustin MD, 6/21/2024 8:16 PM

## 2024-06-22 NOTE — DISCHARGE SUMMARY
Discharge Diagnosis  Nonrheumatic aortic valve stenosis      Test Results Pending At Discharge  Pending Labs       Order Current Status    Basic Metabolic Panel In process    Magnesium In process            Hospital Course    Description of the Procedure:   TAVR   Indication: Severe Aortic Stenosis     Valve used: 34 mm Evolut FX+     Access  Primary: right CFA s/p 2 proglide  Secondary: left CFA 6 Malay s/p Angioseal     TVP: L CFV,  7 Malay, removed in the cath lab.         Post gradient TTE: 4  No PVL  No effusion       6/22/24 - Patient seen and examined, no chest pain or SOB, stable vitals  Telemetry , EKG and Echocardiogram   Plan for discharge today     Pertinent Physical Exam At Time of Discharge  AO x 3  CVS- normal s1, s1, no murmurs  Resp -CTAB  Abd- Soft, NT, ND  Neuro  No gross motor. Sensory deficits   Groin access sites no hematoma, swelling   No LE edema       Home Medications     Medication List      START taking these medications     aspirin 81 mg chewable tablet; Chew 1 tablet (81 mg) once daily.     CONTINUE taking these medications     zolpidem 10 mg tablet; Commonly known as: Ambien       Outpatient Follow-Up  Future Appointments   Date Time Provider Department Center   7/19/2024  9:00 AM  [facility] [id number] CARD1 [id number] [facility]   7/24/2024  1:30 PM [facility] ECHO/VASC ROOM 2 [id number] [facility]   7/31/2024  2:00 PM [facility]   6/18/2025  8:15 AM [facility] 1 [facility]   6/19/2025 10:30 AM  [facility] [id number] CARD1 [id number] Amando Moses MD

## 2024-06-22 NOTE — CARE PLAN
The patient's goals for the shift include  getting adequate rest to promote healing    The clinical goals for the shift include  the patient will remain safe and free from injury throughout the shift

## 2024-06-24 LAB
AORTIC VALVE MEAN GRADIENT: 36 MMHG
AORTIC VALVE PEAK VELOCITY: 3.73 M/S
ATRIAL RATE: 74 BPM
AV PEAK GRADIENT: 55.7 MMHG
AVA (PEAK VEL): 0.94 CM2
AVA (VTI): 0.86 CM2
EJECTION FRACTION: 68 %
LEFT ATRIUM VOLUME AREA LENGTH INDEX BSA: 33.2 ML/M2
LEFT VENTRICLE INTERNAL DIMENSION DIASTOLE: 4.2 CM (ref 3.5–6)
LEFT VENTRICULAR OUTFLOW TRACT DIAMETER: 2.1 CM
MITRAL VALVE E/A RATIO: 0.62
P AXIS: 64 DEGREES
P OFFSET: 172 MS
P ONSET: 108 MS
PR INTERVAL: 220 MS
Q ONSET: 218 MS
QRS COUNT: 12 BEATS
QRS DURATION: 108 MS
QT INTERVAL: 396 MS
QTC CALCULATION(BAZETT): 439 MS
QTC FREDERICIA: 425 MS
R AXIS: 0 DEGREES
T AXIS: 120 DEGREES
T OFFSET: 416 MS
VENTRICULAR RATE: 74 BPM

## 2024-06-25 LAB
ATRIAL RATE: 72 BPM
P AXIS: 61 DEGREES
P OFFSET: 174 MS
P ONSET: 110 MS
PR INTERVAL: 202 MS
Q ONSET: 211 MS
QRS COUNT: 12 BEATS
QRS DURATION: 106 MS
QT INTERVAL: 400 MS
QTC CALCULATION(BAZETT): 438 MS
QTC FREDERICIA: 425 MS
R AXIS: 5 DEGREES
T AXIS: 89 DEGREES
T OFFSET: 411 MS
VENTRICULAR RATE: 72 BPM

## 2024-07-19 ENCOUNTER — TELEMEDICINE (OUTPATIENT)
Dept: CARDIOLOGY | Facility: HOSPITAL | Age: 74
End: 2024-07-19
Payer: MEDICARE

## 2024-07-19 DIAGNOSIS — Z95.2 S/P TAVR (TRANSCATHETER AORTIC VALVE REPLACEMENT): Primary | ICD-10-CM

## 2024-07-19 PROBLEM — I35.0 NONRHEUMATIC AORTIC VALVE STENOSIS: Status: RESOLVED | Noted: 2023-10-30 | Resolved: 2024-07-19

## 2024-07-19 NOTE — PROGRESS NOTES
Structural Heart Follow up visit    Monica Sherman is a 74 y.o. 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     Transthoracic Echo (TTE) Limited    Result Date: 6/24/2024   [facility], [address] [place] [address]                Tel [phone] and Fax [phone] TRANSTHORACIC ECHOCARDIOGRAM REPORT  Patient Name:      MONICA SHERMAN       Reading Physician:    [id number] Endy Keller MD Study Date:        6/21/2024            Ordering Provider:    [id number] BRANDEE HARRIS MRN/PID:           [id number]             Fellow: Accession#:        [id number]         Nurse: Date of Birth/Age: 1950 / 74 years Sonographer:          Koko Diggs RDCS Gender:            M                    Additional Staff: Height:            182.88 cm            Admit Date:           6/21/2024 Weight:            88.00 kg             Admission Status:     Inpatient -                                                               Routine BSA / BMI:         2.10 m2 / 26.31      Encounter#:           [id number]                    kg/m2 Blood Pressure:    /                    Department Location:  [facility]                                                                Cath Lab Study Type:    TRANSTHORACIC ECHO (TTE) LIMITED Diagnosis/ICD: Nonrheumatic aortic (valve) stenosis-I35.0 Indication:    TAVR Periprocedure CPT Code:      Echo Limited-47488; Doppler Limited-10567; Color Doppler-91937 Patient History: Pertinent History: HTN, Dyspnea, Chest Pain and CAD. AS. Study Detail: The following Echo studies were performed: 2D, M-Mode, Doppler and               color flow. Technically challenging study due to body habitus and               patient lying in supine position.  PHYSICIAN INTERPRETATION: Left Ventricle: The left ventricular systolic function is normal, with a visually estimated ejection fraction of 65-70%. There are no regional wall motion abnormalities. The left ventricular cavity size is normal. Spectral Doppler shows an impaired relaxation pattern of left ventricular diastolic filling. Left Atrium: The left atrium was not well visualized. Right Ventricle: The right ventricle is normal in size. There is normal right ventricular global systolic function. Right Atrium: The right atrium is normal in size. Aortic Valve: There is no visualized aortic valve vegetation. The aortic valve appears abnormal. There is moderate to severe aortic valve cusp calcification. There is evidence of severe aortic valve stenosis. The aortic valve dimensionless index is 0.25. There is mild aortic valve regurgitation. The peak instantaneous gradient of the aortic valve is 55.7 mmHg. The mean gradient of the aortic valve is 36.0 mmHg. Mitral Valve: The mitral valve was not well visualized. Mitral valve regurgitation was not assessed. Tricuspid Valve: The tricuspid valve was not assessed. Tricuspid regurgitation was not assessed. Pulmonic Valve: The pulmonic valve was not assessed. Pulmonic valve regurgitation was not assessed. Pericardium: A pericardial effusion was not well visualized. Aorta: The aortic root was not well visualized. Systemic Veins: The inferior vena cava appears to be of normal  size.  Post Transcatheter Aortic Valve Placement (TAVR): The peak instantaneous gradient of the aortic valve is 8.8 mmHg. The mean gradient of the aortic valve is 4.0 mmHg. There is a Medtronic transcatheter aortic valve replacement, with a 34 reported size. There is no maru-prosthetic aortic valve regurgitation.  CONCLUSIONS:  1. Poorly visualized anatomical structures due to suboptimal image quality.  2. The left ventricular systolic function is normal, with a visually estimated ejection fraction of 65-70%.  3. Spectral Doppler shows an impaired relaxation pattern of left ventricular diastolic filling.  4. Aortic valve appears abnormal.  5. No aortic valve vegetation visualized.  6. Severe aortic valve stenosis.  7. There is moderate to severe aortic valve cusp calcification.  8. Mild aortic valve regurgitation. QUANTITATIVE DATA SUMMARY: 2D MEASUREMENTS:                           Normal Ranges: Ao Root d:     4.10 cm    (2.0-3.7cm) LAs:           3.50 cm    (2.7-4.0cm) IVSd:          1.40 cm    (0.6-1.1cm) LVPWd:         1.30 cm    (0.6-1.1cm) LVIDd:         4.20 cm    (3.9-5.9cm) LVIDs:         2.20 cm LV Mass Index: 100.9 g/m2 LV % FS        47.6 % LA VOLUME:                               Normal Ranges: LA Vol A4C:        59.3 ml    (22+/-6mL/m2) LA Vol A2C:        77.9 ml LA Vol BP:         69.8 ml LA Vol Index A4C:  28.2ml/m2 LA Vol Index A2C:  37.1 ml/m2 LA Vol Index BP:   33.2 ml/m2 LA Area A4C:       20.6 cm2 LA Area A2C:       23.0 cm2 LA Major Axis A4C: 6.1 cm LA Major Axis A2C: 5.8 cm LA Volume Index:   33.2 ml/m2 AORTA MEASUREMENTS:                    Normal Ranges: Asc Ao, d: 4.10 cm (2.1-3.4cm) LV SYSTOLIC FUNCTION BY 2D PLANIMETRY (MOD):                      Normal Ranges: EF-Visual:      68 % LV EF Reported: 68 % LV DIASTOLIC FUNCTION:                               Normal Ranges: MV Peak E:        0.48 m/s    (0.7-1.2 m/s) MV Peak A:        0.77 m/s    (0.42-0.7 m/s) E/A Ratio:        0.62         (1.0-2.2) MV e'             0.061 m/s   (>8.0) MV lateral e'     0.07 m/s MV medial e'      0.05 m/s MV A Dur:         117.50 msec E/e' Ratio:       7.89        (<8.0) MV DT:            197 msec    (150-240 msec) PulmV Sys Curt:    54.10 cm/s PulmV Daily Curt:   38.80 cm/s PulmV S/D Curt:    1.40 PulmV A Revs Curt: 29.20 cm/s MITRAL VALVE:                 Normal Ranges: MV DT: 197 msec (150-240msec) AORTIC VALVE:                                              Normal Ranges: AoV Vmax:                          3.73 m/s  (<=1.7m/s) AoV Vmax Post TAVR:                1.48 m/s  (<=1.7m/s) AoV Peak P.7 mmHg (<20mmHg) AoV Peak PG Post TAVR:             8.8 mmHg  (<20mmHg) AoV Mean P.0 mmHg (1.7-11.5mmHg) AoV Mean PG Post TAVR:             4.0 mmHg  (1.7-11.5mmHg) LVOT Max Curt:                      1.01 m/s  (<=1.1m/s) LVOT Max Curt Post TAVR:            1.01 m/s  (<=1.1m/s) AoV VTI:                           92.20 cm  (18-25cm) AoV VTI Post TAVR:                 30.30 cm  (18-25cm) LVOT VTI:                          22.90 cm LVOT VTI Post TAVR:                22.00 cm LVOT Diameter:                     2.10 cm   (1.8-2.4cm) LVOT Diameter Post TAVR:           2.10 cm   (1.8-2.4cm) AoV Area, VTI:                     0.86 cm2  (2.5-5.5cm2) AoV Area, VTI Post TAVR:           2.51 cm2  (2.5-5.5cm2) AoV Area,Vmax:                     0.94 cm2  (2.5-4.5cm2) AoV Area,Vmax Post TAVR:           2.36 cm2  (2.5-4.5cm2) AoV Dimensionless Index:           0.25 AoV Dimensionless Index Post TAVR: 0.73 AORTIC INSUFFICIENCY: AI Vmax:       4.38 m/s AI Half-time:  621 msec AI Decel Rate: 207.00 cm/s2 TRICUSPID VALVE/RVSP:                   Normal Ranges: IVC Diam: 1.50 cm Pulmonary Veins: PulmV A Revs Curt: 29.20 cm/s PulmV Daily Curt:   38.80 cm/s PulmV S/D Curt:    1.40 PulmV Sys Curt:    54.10 cm/s  [id number] Endy Keller MD Electronically signed on 2024 at 12:32:34 PM  ** Final **      Transthoracic Echo (TTE) Limited    Result Date: 6/22/2024   [facility], [address] [place] [address]                Tel [phone] and Fax [phone] TRANSTHORACIC ECHOCARDIOGRAM REPORT  Patient Name:      MONICA SAMUELS       Reading Physician:    [id number] Endy Keller MD Study Date:        6/22/2024            Ordering Provider:    [id number] BRANDEE HARRIS MRN/PID:           [id number]             Fellow: Accession#:        [id number]         Nurse: Date of Birth/Age: 1950 / 74 years Sonographer:          Kumar Tineo RDCS Gender:            M                    Additional Staff: Height:            182.88 cm            Admit Date:           6/21/2024 Weight:            88.00 kg             Admission Status:     Inpatient -                                                               Routine BSA / BMI:         2.10 m2 / 26.31      Encounter#:           [id number]                    kg/m2 Blood Pressure:    131/82 mmHg          Department Location:  [facility]                                                               Invasive Study Type:    TRANSTHORACIC ECHO (TTE) LIMITED Diagnosis/ICD: Other specified postprocedural states-Z98.890 Indication:    S/P TAVR CPT Code:      Echo Limited-32641; Doppler Limited-87258; Color Doppler-07096 Patient History: Pertinent History: Chest Pain, CAD and HTN. S/P TAVR (34 mm Medtronic Evolut):                    6/21/2024. Study Detail: The following Echo studies were performed: 2D, Doppler and color               flow. Technically challenging study due to body habitus.  PHYSICIAN INTERPRETATION: Left Ventricle: The left ventricular systolic function is normal, with a Leroy's biplane calculated ejection fraction of 72%. There are no regional wall motion abnormalities. The left ventricular cavity size is  decreased. Left ventricular diastolic filling was not assessed. Left Atrium: The left atrium was not well visualized. Right Ventricle: The right ventricle is normal in size. There is normal right ventricular global systolic function. Right Atrium: The right atrium is normal in size. Aortic Valve: There is a prosthetic aortic valve present. The aortic valve dimensionless index is 0.55. There is a Medtronic transcatheter aortic valve replacement, with a 34 reported size. There is mild prosthetic aortic valve regurgitation. There is mild aortic valve regurgitation. The peak instantaneous gradient of the aortic valve is 11.3 mmHg. The mean gradient of the aortic valve is 5.0 mmHg. Mitral Valve: The mitral valve is normal in structure. There is no evidence of mitral valve regurgitation. Tricuspid Valve: The tricuspid valve is structurally normal. There is trace tricuspid regurgitation. The Doppler estimated RVSP is within normal limits at 20.3 mmHg. Pulmonic Valve: The pulmonic valve is structurally normal. There is mild to moderate pulmonic valve regurgitation. Pericardium: There is no pericardial effusion noted. Aorta: The aortic root is normal. Systemic Veins: The inferior vena cava size appears small. There is IVC inspiratory collapse greater than 50%. In comparison to the previous echocardiogram(s): Compared with study dated 6/21/2024,. S/P TAVR, slight increase in gradient, prior was 4 and 8 mm Hg, now 5 and 11 mmHg.  CONCLUSIONS:  1. The left ventricular systolic function is normal, with a Leroy's biplane calculated ejection fraction of 72%.  2. Left ventricular cavity size is decreased.  3. RVSP within normal limits.  4. There is a transcatheter aortic valve replacement.  5. Mild aortic valve regurgitation. QUANTITATIVE DATA SUMMARY: LV SYSTOLIC FUNCTION BY 2D PLANIMETRY (MOD):                      Normal Ranges: EF-A4C View:    71 % (>=55%) EF-A2C View:    73 % EF-Biplane:     72 % LV EF Reported: 72 % AORTIC  VALVE:                                    Normal Ranges: AoV Vmax:                1.68 m/s  (<=1.7m/s) AoV Peak P.3 mmHg (<20mmHg) AoV Mean P.0 mmHg  (1.7-11.5mmHg) LVOT Max Curt:            0.85 m/s  (<=1.1m/s) AoV VTI:                 33.70 cm  (18-25cm) LVOT VTI:                18.40 cm AoV Dimensionless Index: 0.55 TRICUSPID VALVE/RVSP:                             Normal Ranges: Peak TR Velocity: 2.08 m/s Est. RA Pressure: 3 mmHg RV Syst Pressure: 20.3 mmHg (< 30mmHg) IVC Diam:         1.37 cm  [id number] Endy Keller MD Electronically signed on 2024 at 11:59:32 AM  ** Final **               Heart Failure Follow up    NYHA class 1    Edema Denies  Dyspnea on Exertion Improved  Fatigue Improved  Exercise Intolerance Improved  Orthopnea Denies  PND Denies    Chest pain No  Syncope No  Palpitations No    All organ systems normal               KCCQ Questionnaire  1  Heart failure affects different people in different ways. Some feel shortness of breath while others feel fatigue. Please indicate how much you are limited by heart failure (shortness of breath or fatigue) in your ability to do the following activities over the past 2 weeks.     A.) Showering/bathing  5. Not at All  B.) Walking 1 block on level ground 5. Not at All  C.) Hurrying or Jogging   4. Slightly    2.  Over the past 2 weeks, how many times did you have swelling in your feet, ankles or legs when you woke up in the morning? 5. Never    3.  Over the past 2 weeks, on average, how many times has fatigue limited your ability to do what you wanted? 6. Less than once a week    4.  Over the past 2 weeks, on average, how many times has shortness of breath limited your ability to do what you wanted? 7. Never    5.  Over the past 2 weeks, on average, how many times have you been forced to sleep sitting up in a chair or with at least 3 pillows to prop you up because of shortness of breath? Never    6. Over the past 2 weeks, how  "much has your heart failure limited your enjoyment of life? It has not limited my enjoyment of life    7. If you had to spend the rest of your life with your heart failure the way it is right now, how would you feel about this? 5. Completely satisfied    8. How much does your heart failure affect your lifestyle? Please indicate how your heart failure may have limited yourparticipation in the following activities over the past 2 weeks    A.)  Hobbies, recreational activities  5. Did not limit at all    B.) Working or doing household chores  5. Did not limit at all    C.) Visiting family or friends out of your home  5. Did not limit at all      Mr. Sherman is a 75 y/o M with a pmh of HTN, carpel tunnel syndrome, and skin CA (s/p Sx).  Presents today for the 1 month follow-up s/p TAVR - 34 mm Evolut FX+ on 6/21/24 with Dr. Moser and Dr. Vazquez.    Impression  - 1 month s/p TAVR  - states he is feeling great, \"feels 30 years younger\"  - HF symptoms:  denies all  - vitals:  HR 70s, /80, wt stable  - groins:  healed  - Overall sounds to be doing very well 1 month s/p TAVR.  Encouraged to continue to remain active.    1 month ECHO - scheduled 7/24/24, will follow results    Plan:   Cont ASA for life  Cont current medication regimen  Cont to increase activity  f/u with Structural NP in 1 year - ECHO can be closer to home  f/u with Dr. Zapata (Primary Cards) as scheduled or in 6-10 weeks  Life-long Dental SBE prophylaxis needed - Amoxicillin    I personally spent 24 minutes with this patient via phone, of which >50% of time was spent counseling and coordination of care     Luis Antonio Soto MSN, [facility]  Acute Care Nurse Practitioner  Structural Heart / TAVR Team  [phone] (ph)  [phone] (fax)    "

## 2024-07-24 ENCOUNTER — HOSPITAL ENCOUNTER (OUTPATIENT)
Dept: CARDIOLOGY | Facility: CLINIC | Age: 74
Discharge: HOME | End: 2024-07-24
Payer: MEDICARE

## 2024-07-24 VITALS
DIASTOLIC BLOOD PRESSURE: 80 MMHG | BODY MASS INDEX: 27.16 KG/M2 | HEIGHT: 71 IN | SYSTOLIC BLOOD PRESSURE: 126 MMHG | WEIGHT: 194 LBS

## 2024-07-24 DIAGNOSIS — Z95.2 S/P TAVR (TRANSCATHETER AORTIC VALVE REPLACEMENT): ICD-10-CM

## 2024-07-24 DIAGNOSIS — I35.0 NONRHEUMATIC AORTIC VALVE STENOSIS: ICD-10-CM

## 2024-07-24 PROCEDURE — 93306 TTE W/DOPPLER COMPLETE: CPT

## 2024-07-26 LAB
AORTIC VALVE MEAN GRADIENT: 6 MMHG
AORTIC VALVE PEAK VELOCITY: 1.85 M/S
AV PEAK GRADIENT: 13.7 MMHG
AVA (PEAK VEL): 2.98 CM2
AVA (VTI): 3.56 CM2
EJECTION FRACTION APICAL 4 CHAMBER: 71.6
EJECTION FRACTION: 67 %
LEFT VENTRICLE INTERNAL DIMENSION DIASTOLE: 4.11 CM (ref 3.5–6)
LEFT VENTRICULAR OUTFLOW TRACT DIAMETER: 2.8 CM
LV EJECTION FRACTION BIPLANE: 67 %
MITRAL VALVE E/A RATIO: 0.83
MITRAL VALVE E/E' RATIO: 8.1
RIGHT VENTRICLE PEAK SYSTOLIC PRESSURE: 23.4 MMHG

## 2024-10-02 ENCOUNTER — HOSPITAL ENCOUNTER
Dept: HOSPITAL 101 - PST | Age: 74
Discharge: HOME | End: 2024-10-02
Payer: MEDICARE

## 2024-10-02 DIAGNOSIS — Z01.818: Primary | ICD-10-CM

## 2024-10-02 DIAGNOSIS — H25.12: ICD-10-CM

## 2024-10-03 ENCOUNTER — HOSPITAL ENCOUNTER (OUTPATIENT)
Dept: HOSPITAL 101 - SURGOUT | Age: 74
Discharge: HOME | End: 2024-10-03
Payer: MEDICARE

## 2024-10-03 VITALS
SYSTOLIC BLOOD PRESSURE: 141 MMHG | OXYGEN SATURATION: 96 % | TEMPERATURE: 97.7 F | HEART RATE: 75 BPM | DIASTOLIC BLOOD PRESSURE: 84 MMHG

## 2024-10-03 VITALS — HEART RATE: 65 BPM | DIASTOLIC BLOOD PRESSURE: 90 MMHG | OXYGEN SATURATION: 100 % | SYSTOLIC BLOOD PRESSURE: 140 MMHG

## 2024-10-03 DIAGNOSIS — H25.12: Primary | ICD-10-CM

## 2024-10-03 PROCEDURE — V2630 ANTER CHAMBER INTRAOCUL LENS: HCPCS

## 2024-10-03 PROCEDURE — 66984 XCAPSL CTRC RMVL W/O ECP: CPT

## 2024-10-03 RX ADMIN — PHENYLEPHRINE HYDROCHLORIDE 1 DROP: 25 SOLUTION/ DROPS OPHTHALMIC at 10:10

## 2024-10-03 RX ADMIN — LIDOCAINE HYDROCHLORIDE 2 ML: 10 INJECTION, SOLUTION EPIDURAL; INFILTRATION; INTRACAUDAL; PERINEURAL at 10:52

## 2024-10-03 RX ADMIN — PHENYLEPHRINE HYDROCHLORIDE 1 DROP: 25 SOLUTION/ DROPS OPHTHALMIC at 10:37

## 2024-10-03 RX ADMIN — BESIFLOXACIN 1 DROP: 6 SUSPENSION OPHTHALMIC at 10:36

## 2024-10-03 RX ADMIN — TETRACAINE HYDROCHLORIDE 1 DROP: 5 SOLUTION OPHTHALMIC at 10:53

## 2024-10-03 RX ADMIN — PHENYLEPHRINE AND KETOROLAC 4 ML: 10.16; 2.88 INJECTION, SOLUTION, CONCENTRATE INTRAOCULAR at 10:52

## 2024-10-03 RX ADMIN — CYCLOPENTOLATE HYDROCHLORIDE 1 DROP: 10 SOLUTION OPHTHALMIC at 10:16

## 2024-10-03 RX ADMIN — PHENYLEPHRINE HYDROCHLORIDE 1 DROP: 25 SOLUTION/ DROPS OPHTHALMIC at 10:24

## 2024-10-03 RX ADMIN — LIDOCAINE HYDROCHLORIDE 10 ML: 20 JELLY TOPICAL at 10:39

## 2024-10-03 RX ADMIN — TROPICAMIDE 1 DROP: 10 SOLUTION/ DROPS OPHTHALMIC at 10:36

## 2024-10-03 RX ADMIN — APRACLONIDINE 4 DROP: 5.75 SOLUTION OPHTHALMIC at 11:02

## 2024-10-03 RX ADMIN — TROPICAMIDE 1 DROP: 10 SOLUTION/ DROPS OPHTHALMIC at 10:16

## 2024-10-03 RX ADMIN — DIAZEPAM 5 MG: 5 TABLET ORAL at 10:17

## 2024-10-03 RX ADMIN — CYCLOPENTOLATE HYDROCHLORIDE 1 DROP: 10 SOLUTION OPHTHALMIC at 10:37

## 2024-10-03 RX ADMIN — PROPARACAINE HYDROCHLORIDE 1 DROP: 5 SOLUTION/ DROPS OPHTHALMIC at 10:39

## 2024-10-03 RX ADMIN — CYCLOPENTOLATE HYDROCHLORIDE 1 DROP: 10 SOLUTION OPHTHALMIC at 10:24

## 2024-10-03 RX ADMIN — PHENYLEPHRINE HYDROCHLORIDE 1 DROP: 25 SOLUTION/ DROPS OPHTHALMIC at 10:16

## 2024-10-03 RX ADMIN — BESIFLOXACIN 1 DROP: 6 SUSPENSION OPHTHALMIC at 10:10

## 2024-10-03 RX ADMIN — Medication 16 MG: at 10:52

## 2024-10-03 RX ADMIN — BESIFLOXACIN 1 DROP: 6 SUSPENSION OPHTHALMIC at 10:17

## 2024-10-03 RX ADMIN — BESIFLOXACIN 1 DROP: 6 SUSPENSION OPHTHALMIC at 10:25

## 2024-10-03 RX ADMIN — TROPICAMIDE 1 DROP: 10 SOLUTION/ DROPS OPHTHALMIC at 10:24

## 2024-10-03 RX ADMIN — TROPICAMIDE 1 DROP: 10 SOLUTION/ DROPS OPHTHALMIC at 10:10

## 2024-10-03 RX ADMIN — PREDNISOLONE ACETATE 1 DROP: 10 SUSPENSION/ DROPS OPHTHALMIC at 11:03

## 2024-10-03 RX ADMIN — POVIDONE-IODINE 30 ML: 5 SOLUTION OPHTHALMIC at 10:39

## 2024-10-03 RX ADMIN — CYCLOPENTOLATE HYDROCHLORIDE 1 DROP: 10 SOLUTION OPHTHALMIC at 10:10

## 2024-10-03 NOTE — OP_ITS
OPERATION DATE:   10/03/2024  
  
SURGEON:  Samuel Ardon D.O.   
  
PREOPERATIVE DIAGNOSIS:  Nuclear sclerotic cataract left eye  
  
POSTOPERATIVE DIAGNOSIS:  Nuclear sclerotic cataract left eye.  
  
PROCEDURE NAME:  Cataract extraction with intraocular lens placement of the left
eye.  
  
ANESTHESIA:  Topical  
  
ESTIMATED BLOOD LOSS:  Zero.  
  
COMPLICATIONS:  None.  
  
PROCEDURE:   In the preoperative holding area, the patient received topical 
proparacaine and was then sat up in an upright position.  A toshia was placed at 
the 6 o?clock limbus for future reference of the Toric lens.  The patient was 
then brought to the Operating Room in supine position.  After proper 
identification, the left eye was prepped and draped in a sterile ophthalmic 
fashion.  A paracentesis was created at the 5 o'clock position.  Approximately 1
cc of unpreserved Xylocaine was injected into the anterior chamber followed by 
Amvisc Plus.  Using a 2.6 mm Keratome blade, a clear corneal incision was 
created at the 3 o'clock limbus.  A cystotome was then used to begin a 
curvilinear capsulorrhexis that was continued for 360 degrees with the Utrata 
forceps.  BSS on a 26 gauge cannula was injected beneath the anterior capsule to
hydrodissect as well as hydrodelineate the lens.  After ensuring mobility, 
phacoemulsification was performed in a conquer-and-divide-type fashion.  After 
all nuclear material had been removed from the eye, IA was introduced and all 
residual cortical material was cleaned up.  Additional Amvisc Plus was injected 
into the posterior bag to pressurize and stabilize it.  Referencing the 6 
o?clock limbal toshia, the 49 degree axis was identified with the Omgili Toric 
marking system.  The lens was then inserted Model [id number], 23.5 diopters and 
dialed approximately 10 degrees shy of the 49 degree axis.  IA was reintroduced 
into the anterior chamber and all residual Amvisc Plus was removed from the eye.
 Utilizing the tip of the IA, the lens was rotated the final 10 degrees to the 
49 degree axis.  BSS on a 30 gauge cannula was injected into the stroma of both 
the clear corneal incision as well as the paracentesis to hydrate the wounds.  
Additional BSS was injected into the anterior chamber to pressurize the eye at 
approximately 20 to 22 mmHg by finger tension.  0.1 cc of antibiotic was 
injected into the anterior chamber, and Weck-Genia sponges were used to check the 
wounds to be watertight.  One drop of Apraclonidine and one drop of prednisolone
acetate were placed into the eye and a shield was placed over top. The patient 
was sent to the postoperative area in satisfactory condition to follow up the 
following day for postoperative care.  
 OTTO

## 2024-10-24 ENCOUNTER — APPOINTMENT (OUTPATIENT)
Dept: CARDIOLOGY | Facility: CLINIC | Age: 74
End: 2024-10-24
Payer: MEDICARE

## 2024-10-24 VITALS
HEIGHT: 71 IN | DIASTOLIC BLOOD PRESSURE: 88 MMHG | SYSTOLIC BLOOD PRESSURE: 132 MMHG | BODY MASS INDEX: 27.16 KG/M2 | WEIGHT: 194 LBS | HEART RATE: 88 BPM

## 2024-10-24 DIAGNOSIS — Z78.9 NEVER SMOKED TOBACCO: ICD-10-CM

## 2024-10-24 DIAGNOSIS — R06.02 SHORTNESS OF BREATH: ICD-10-CM

## 2024-10-24 DIAGNOSIS — I10 ESSENTIAL HYPERTENSION, BENIGN: ICD-10-CM

## 2024-10-24 DIAGNOSIS — Z95.2 S/P TAVR (TRANSCATHETER AORTIC VALVE REPLACEMENT): Primary | ICD-10-CM

## 2024-10-24 DIAGNOSIS — I10 WHITE COAT SYNDROME WITH DIAGNOSIS OF HYPERTENSION: ICD-10-CM

## 2024-10-24 PROCEDURE — 99213 OFFICE O/P EST LOW 20 MIN: CPT | Performed by: INTERNAL MEDICINE

## 2024-10-24 PROCEDURE — 1036F TOBACCO NON-USER: CPT | Performed by: INTERNAL MEDICINE

## 2024-10-24 PROCEDURE — 1160F RVW MEDS BY RX/DR IN RCRD: CPT | Performed by: INTERNAL MEDICINE

## 2024-10-24 PROCEDURE — 3079F DIAST BP 80-89 MM HG: CPT | Performed by: INTERNAL MEDICINE

## 2024-10-24 PROCEDURE — 1159F MED LIST DOCD IN RCRD: CPT | Performed by: INTERNAL MEDICINE

## 2024-10-24 PROCEDURE — G2211 COMPLEX E/M VISIT ADD ON: HCPCS | Performed by: INTERNAL MEDICINE

## 2024-10-24 PROCEDURE — 3075F SYST BP GE 130 - 139MM HG: CPT | Performed by: INTERNAL MEDICINE

## 2024-10-24 NOTE — PROGRESS NOTES
"Subjective   Calr Sherman is a 74 y.o. male       Chief Complaint    Follow-up          HPI   Patient is here for follow-up to management for history of moderate to aortic valve disease.  He recently developed symptoms in his echo are suggestive of and a heart cath and a PACHECO confirm the presence of severe AS and ultimately underwent TAVR.  Since then he has noted marked improvement of his functional status.  He denies chest pain, palpitation, lightheadedness, dizziness or syncope.  severe AS.  He was evaluated he report 1 brief episode of pleuritic type of chest pain 2 weeks after the TAVR resolved apparently his testing showed some kidney cyst per his PCP.    Assessment     1.  Aortic stenosis status post TAVR doing very well with improvement functional status  2.  Probably stage I hypertension with whitecoat hypertension.  Managed by nonpharmacologic approach  3.  Rare intermittent edema.  Resolved  4.  BMI 27  5.  Multiple orthopedic procedures in the past  6.  Chronic back pain with prior back surgery     Plan     1.  I reviewed with the patient the result of his diagnostic testing.  His TAVR report.  I encourage and emphasize endocarditis prophylaxis  2.  I suggested to continue aspirin and to continue salt restriction  3.  I will see him back in 6 months and follow-up    ROS         Vitals:    10/24/24 1302   BP: 132/88   BP Location: Left arm   Patient Position: Sitting   Pulse: 88   Weight: 88 kg (194 lb)   Height: 1.803 m (5' 11\")        Objective   Physical Exam  Constitutional:       Appearance: Normal appearance.   HENT:      Nose: Nose normal.   Neck:      Vascular: No carotid bruit.   Cardiovascular:      Rate and Rhythm: Normal rate.      Pulses: Normal pulses.      Heart sounds: Murmur heard.      Systolic murmur is present with a grade of 2/6.   Pulmonary:      Effort: Pulmonary effort is normal.   Abdominal:      General: Bowel sounds are normal.      Palpations: Abdomen is soft. "   Musculoskeletal:         General: Normal range of motion.      Cervical back: Normal range of motion.      Right lower leg: No edema.      Left lower leg: No edema.   Skin:     General: Skin is warm and dry.   Neurological:      General: No focal deficit present.      Mental Status: He is alert.   Psychiatric:         Mood and Affect: Mood normal.         Behavior: Behavior normal.         Thought Content: Thought content normal.         Judgment: Judgment normal.         Allergies  Cephalexin     Current Medications    Current Outpatient Medications:     aspirin 81 mg chewable tablet, Chew 1 tablet (81 mg) once daily., Disp: 180 tablet, Rfl: 0    zolpidem (Ambien) 10 mg tablet, Take 0.5 tablets (5 mg) by mouth as needed at bedtime for sleep., Disp: , Rfl:                      Assessment/Plan   1. S/P TAVR (transcatheter aortic valve replacement)  Follow Up In Cardiology      2. White coat syndrome with diagnosis of hypertension        3. Essential hypertension, benign        4. BMI 27.0-27.9,adult        5. Shortness of breath        6. Never smoked tobacco                 Scribe Attestation  By signing my name below, Marianna MTZ LPN, Scribe   attest that this documentation has been prepared under the direction and in the presence of Lizette Zapata MD.     Provider Attestation - Scribe documentation    All medical record entries made by the Scribe were at my direction and personally dictated by me. I have reviewed the chart and agree that the record accurately reflects my personal performance of the history, physical exam, discussion and plan.

## 2024-10-24 NOTE — PATIENT INSTRUCTIONS
Please bring all medicines, vitamins, and herbal supplements with you when you come to the office.    Prescriptions will not be filled unless you are compliant with your follow up appointments or have a follow up appointment scheduled as per instruction of your physician. Refills should be requested at the time of your visit.     BMI was above normal measurement. Current weight: 88 kg (194 lb)  Weight change since last visit (-) denotes wt loss 0 lbs   Weight loss needed to achieve BMI 25: 15.1 Lbs  Weight loss needed to achieve BMI 30: -20.6 Lbs  Provided instructions on dietary changes  Provided instructions on exercise.    6 months  Antibiotics before procedure

## 2024-10-24 NOTE — LETTER
October 24, 2024     Stacey Jang DO  [address] [place] [address]    Patient: Carl Sherman   YOB: 1950   Date of Visit: 10/24/2024       Dear Dr. Stacey Jang DO:    Thank you for referring Carl Sherman to me for evaluation. Below are my notes for this consultation.  If you have questions, please do not hesitate to call me. I look forward to following your patient along with you.       Sincerely,     Lizette Zapata MD      CC: No Recipients  ______________________________________________________________________________________    Subjective   Carl Sherman is a 74 y.o. male       Chief Complaint    Follow-up          HPI   Patient is here for follow-up to management for history of moderate to aortic valve disease.  He recently developed symptoms in his echo are suggestive of and a heart cath and a PACHECO confirm the presence of severe AS and ultimately underwent TAVR.  Since then he has noted marked improvement of his functional status.  He denies chest pain, palpitation, lightheadedness, dizziness or syncope.  severe AS.  He was evaluated he report 1 brief episode of pleuritic type of chest pain 2 weeks after the TAVR resolved apparently his testing showed some kidney cyst per his PCP.    Assessment     1.  Aortic stenosis status post TAVR doing very well with improvement functional status  2.  Probably stage I hypertension with whitecoat hypertension.  Managed by nonpharmacologic approach  3.  Rare intermittent edema.  Resolved  4.  BMI 27  5.  Multiple orthopedic procedures in the past  6.  Chronic back pain with prior back surgery     Plan     1.  I reviewed with the patient the result of his diagnostic testing.  His TAVR report.  I encourage and emphasize endocarditis prophylaxis  2.  I suggested to continue aspirin and to continue salt restriction  3.  I will see him back in 6 months and follow-up    ROS         Vitals:    10/24/24 1302   BP: 132/88   BP Location: Left  "arm   Patient Position: Sitting   Pulse: 88   Weight: 88 kg (194 lb)   Height: 1.803 m (5' 11\")        Objective   Physical Exam  Constitutional:       Appearance: Normal appearance.   HENT:      Nose: Nose normal.   Neck:      Vascular: No carotid bruit.   Cardiovascular:      Rate and Rhythm: Normal rate.      Pulses: Normal pulses.      Heart sounds: Murmur heard.      Systolic murmur is present with a grade of 2/6.   Pulmonary:      Effort: Pulmonary effort is normal.   Abdominal:      General: Bowel sounds are normal.      Palpations: Abdomen is soft.   Musculoskeletal:         General: Normal range of motion.      Cervical back: Normal range of motion.      Right lower leg: No edema.      Left lower leg: No edema.   Skin:     General: Skin is warm and dry.   Neurological:      General: No focal deficit present.      Mental Status: He is alert.   Psychiatric:         Mood and Affect: Mood normal.         Behavior: Behavior normal.         Thought Content: Thought content normal.         Judgment: Judgment normal.         Allergies  Cephalexin     Current Medications    Current Outpatient Medications:   •  aspirin 81 mg chewable tablet, Chew 1 tablet (81 mg) once daily., Disp: 180 tablet, Rfl: 0  •  zolpidem (Ambien) 10 mg tablet, Take 0.5 tablets (5 mg) by mouth as needed at bedtime for sleep., Disp: , Rfl:                      Assessment/Plan   1. S/P TAVR (transcatheter aortic valve replacement)  Follow Up In Cardiology      2. White coat syndrome with diagnosis of hypertension        3. Essential hypertension, benign        4. BMI 27.0-27.9,adult        5. Shortness of breath        6. Never smoked tobacco                 Scribe Attestation  By signing my name below, Marianna MTZ LPN, Scribe   attest that this documentation has been prepared under the direction and in the presence of Lizette Zapata MD.     Provider Attestation - Scribe documentation    All medical record entries made by the Scribe were " at my direction and personally dictated by me. I have reviewed the chart and agree that the record accurately reflects my personal performance of the history, physical exam, discussion and plan.

## 2025-05-08 ENCOUNTER — APPOINTMENT (OUTPATIENT)
Dept: CARDIOLOGY | Facility: CLINIC | Age: 75
End: 2025-05-08
Payer: MEDICARE

## 2025-05-08 VITALS
SYSTOLIC BLOOD PRESSURE: 138 MMHG | WEIGHT: 192 LBS | HEART RATE: 78 BPM | DIASTOLIC BLOOD PRESSURE: 88 MMHG | HEIGHT: 71 IN | BODY MASS INDEX: 26.88 KG/M2

## 2025-05-08 DIAGNOSIS — Z95.2 S/P TAVR (TRANSCATHETER AORTIC VALVE REPLACEMENT): Primary | ICD-10-CM

## 2025-05-08 DIAGNOSIS — R06.02 SHORTNESS OF BREATH: ICD-10-CM

## 2025-05-08 DIAGNOSIS — I10 ESSENTIAL HYPERTENSION, BENIGN: ICD-10-CM

## 2025-05-08 DIAGNOSIS — Z78.9 NEVER SMOKED TOBACCO: ICD-10-CM

## 2025-05-08 DIAGNOSIS — I10 WHITE COAT SYNDROME WITH DIAGNOSIS OF HYPERTENSION: ICD-10-CM

## 2025-05-08 PROCEDURE — 1036F TOBACCO NON-USER: CPT | Performed by: INTERNAL MEDICINE

## 2025-05-08 PROCEDURE — 1160F RVW MEDS BY RX/DR IN RCRD: CPT | Performed by: INTERNAL MEDICINE

## 2025-05-08 PROCEDURE — 1159F MED LIST DOCD IN RCRD: CPT | Performed by: INTERNAL MEDICINE

## 2025-05-08 PROCEDURE — 3075F SYST BP GE 130 - 139MM HG: CPT | Performed by: INTERNAL MEDICINE

## 2025-05-08 PROCEDURE — 3079F DIAST BP 80-89 MM HG: CPT | Performed by: INTERNAL MEDICINE

## 2025-05-08 PROCEDURE — 99213 OFFICE O/P EST LOW 20 MIN: CPT | Performed by: INTERNAL MEDICINE

## 2025-05-08 PROCEDURE — G2211 COMPLEX E/M VISIT ADD ON: HCPCS | Performed by: INTERNAL MEDICINE

## 2025-05-08 NOTE — LETTER
May 8, 2025     Stacey Jang DO  [address] [place] [address]    Patient: Carl Sherman   YOB: 1950   Date of Visit: 5/8/2025       Dear Dr. Stacey Jang DO:    Thank you for referring Carl Sherman to me for evaluation. Below are my notes for this consultation.  If you have questions, please do not hesitate to call me. I look forward to following your patient along with you.       Sincerely,     Lizette Zapata MD      CC: No Recipients  ______________________________________________________________________________________    Chief Complaint   Patient presents with   • Follow-up     6 month, hypertension        Subjective   Carl Sherman is a 75 y.o. male     HPI   Patient is here for follow-up continue management for history of aortic valve replacement, borderline hypertension, and some back pain.  Since last time I saw him he denies any cardiac Emplin chest pain, palpitation, lightheadedness, dizziness or syncope.  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  He does good strenuous activity without any cardiac complaint.    Assessment     1.  Aortic stenosis status post TAVR.  Described functional class I  2.  Probably stage I hypertension with whitecoat hypertension.  Managed by nonpharmacologic approach.  Blood pressure is well-controlled  3.  Rare intermittent edema.  Resolved  4.  BMI 26  5.  Multiple orthopedic procedures in the past  6.  Chronic back pain with prior back surgery  7.  Mild residual aortic regurgitation following TAVR     Plan     1.  I advised the patient to repeat his echo in September  2.  I suggested to continue aspirin and to continue salt restriction.  We discussed endocarditis prophylaxis  3.  I will see him back in in 1 year  Review of Systems   All other systems reviewed and are negative.           Vitals:    05/08/25 1353   BP: 138/88   BP Location: Left arm   Patient Position: Sitting   Pulse: 78   Weight: 87.1 kg (192 lb)  "  Height: 1.803 m (5' 11\")        Objective   Physical Exam  Constitutional:       Appearance: Normal appearance.   HENT:      Nose: Nose normal.   Neck:      Vascular: No carotid bruit.   Cardiovascular:      Rate and Rhythm: Normal rate.      Pulses: Normal pulses.      Heart sounds: Murmur heard.      Systolic murmur is present with a grade of 2/6.   Pulmonary:      Effort: Pulmonary effort is normal.   Abdominal:      General: Bowel sounds are normal.      Palpations: Abdomen is soft.   Musculoskeletal:         General: Normal range of motion.      Cervical back: Normal range of motion.      Right lower leg: No edema.      Left lower leg: No edema.   Skin:     General: Skin is warm and dry.   Neurological:      General: No focal deficit present.      Mental Status: He is alert.   Psychiatric:         Mood and Affect: Mood normal.         Behavior: Behavior normal.         Thought Content: Thought content normal.         Judgment: Judgment normal.         Allergies  Cephalexin     Current Medications  Current Outpatient Medications   Medication Instructions   • aspirin 81 mg, oral, Daily   • zolpidem (AMBIEN) 5 mg, Nightly PRN                        Assessment/Plan   1. S/P TAVR (transcatheter aortic valve replacement)  Follow Up In Cardiology    Follow Up In Cardiology    Transthoracic Echo Complete      2. White coat syndrome with diagnosis of hypertension        3. Essential hypertension, benign        4. BMI 27.0-27.9,adult        5. Shortness of breath        6. Never smoked tobacco                 Scribe Attestation  By signing my name below, Marianna MTZ LPN, Scribe   attest that this documentation has been prepared under the direction and in the presence of Lizette Zapata MD.     Provider Attestation - Scribe documentation    All medical record entries made by the Scribe were at my direction and personally dictated by me. I have reviewed the chart and agree that the record accurately reflects my " personal performance of the history, physical exam, discussion and plan.

## 2025-05-08 NOTE — PROGRESS NOTES
"Chief Complaint   Patient presents with    Follow-up     6 month, hypertension        Subjective   Carl Sherman is a 75 y.o. male     HPI   Patient is here for follow-up continue management for history of aortic valve replacement, borderline hypertension, and some back pain.  Since last time I saw him he denies any cardiac Emplin chest pain, palpitation, lightheadedness, dizziness or syncope.  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  He does good strenuous activity without any cardiac complaint.    Assessment     1.  Aortic stenosis status post TAVR.  Described functional class I  2.  Probably stage I hypertension with whitecoat hypertension.  Managed by nonpharmacologic approach.  Blood pressure is well-controlled  3.  Rare intermittent edema.  Resolved  4.  BMI 26  5.  Multiple orthopedic procedures in the past  6.  Chronic back pain with prior back surgery  7.  Mild residual aortic regurgitation following TAVR     Plan     1.  I advised the patient to repeat his echo in September 2.  I suggested to continue aspirin and to continue salt restriction.  We discussed endocarditis prophylaxis  3.  I will see him back in in 1 year  Review of Systems   All other systems reviewed and are negative.           Vitals:    05/08/25 1353   BP: 138/88   BP Location: Left arm   Patient Position: Sitting   Pulse: 78   Weight: 87.1 kg (192 lb)   Height: 1.803 m (5' 11\")        Objective   Physical Exam  Constitutional:       Appearance: Normal appearance.   HENT:      Nose: Nose normal.   Neck:      Vascular: No carotid bruit.   Cardiovascular:      Rate and Rhythm: Normal rate.      Pulses: Normal pulses.      Heart sounds: Murmur heard.      Systolic murmur is present with a grade of 2/6.   Pulmonary:      Effort: Pulmonary effort is normal.   Abdominal:      General: Bowel sounds are normal.      Palpations: Abdomen is soft.   Musculoskeletal:         General: Normal range of motion.      Cervical back: " Normal range of motion.      Right lower leg: No edema.      Left lower leg: No edema.   Skin:     General: Skin is warm and dry.   Neurological:      General: No focal deficit present.      Mental Status: He is alert.   Psychiatric:         Mood and Affect: Mood normal.         Behavior: Behavior normal.         Thought Content: Thought content normal.         Judgment: Judgment normal.         Allergies  Cephalexin     Current Medications  Current Outpatient Medications   Medication Instructions    aspirin 81 mg, oral, Daily    zolpidem (AMBIEN) 5 mg, Nightly PRN                        Assessment/Plan   1. S/P TAVR (transcatheter aortic valve replacement)  Follow Up In Cardiology    Follow Up In Cardiology    Transthoracic Echo Complete      2. White coat syndrome with diagnosis of hypertension        3. Essential hypertension, benign        4. BMI 27.0-27.9,adult        5. Shortness of breath        6. Never smoked tobacco                 Scribe Attestation  By signing my name below, Marianna MTZ LPN, Scribe   attest that this documentation has been prepared under the direction and in the presence of Lizette Zapata MD.     Provider Attestation - Scribe documentation    All medical record entries made by the Scribe were at my direction and personally dictated by me. I have reviewed the chart and agree that the record accurately reflects my personal performance of the history, physical exam, discussion and plan.

## 2025-05-08 NOTE — PATIENT INSTRUCTIONS
Please bring all medicines, vitamins, and herbal supplements with you when you come to the office.    Prescriptions will not be filled unless you are compliant with your follow up appointments or have a follow up appointment scheduled as per instruction of your physician. Refills should be requested at the time of your visit.     BMI was above normal measurement. Current weight: 87.1 kg (192 lb)  Weight change since last visit (-) denotes wt loss -2 lbs   Weight loss needed to achieve BMI 25: 13.1 Lbs  Weight loss needed to achieve BMI 30: -22.6 Lbs  Provided instructions on dietary changes  Provided instructions on exercise.    Echo Sept/Oct  Follow up one year

## 2025-06-18 ENCOUNTER — APPOINTMENT (OUTPATIENT)
Dept: RADIOLOGY | Facility: HOSPITAL | Age: 75
End: 2025-06-18
Payer: MEDICARE

## 2025-06-19 ENCOUNTER — APPOINTMENT (OUTPATIENT)
Dept: CARDIOLOGY | Facility: CLINIC | Age: 75
End: 2025-06-19
Payer: MEDICARE

## 2026-05-08 ENCOUNTER — APPOINTMENT (OUTPATIENT)
Dept: CARDIOLOGY | Facility: CLINIC | Age: 76
End: 2026-05-08
Payer: MEDICARE

## (undated) DEVICE — GUIDE WIRE, 035/190CM, HI-TORGUE SUPRA CORE

## (undated) DEVICE — Device

## (undated) DEVICE — SHEATH, BRITE TIP 6 X 35CM

## (undated) DEVICE — CATHETER, PACING, PACEL, FLOW DIRECTED, 5 FR X 110 CM

## (undated) DEVICE — INTRODUCER SHEATH, SENTRANT ENSURE SEAL 18FR 28CM

## (undated) DEVICE — GUIDEWIRE, INQWIRE, 3MM J, .035, 260

## (undated) DEVICE — CATHETER, ANGIO, IMPULSE, AL1, 6 FR X 100 CM

## (undated) DEVICE — CATHETER, VALVULOPLASTY, TRUE DILATION, 12FR X 23MM X 110CM

## (undated) DEVICE — CATHETER, ANGIO, IMPULSE, FR4, 6 FR X 100 CM

## (undated) DEVICE — KIT, INTRODUCER MICROPUNCTURE, 4FR STIFF, NITINOL WIRE, TUNGSTEN TIP

## (undated) DEVICE — CATHETER, DIAGNOSTIC, DXTERITY, 6FR PIG155, 110CM.6 SH

## (undated) DEVICE — 7 FR X 12CM FAST-CATH HEMOSTASIS INTRODUCER,W/LUER LOCK HUB, HEMO VALVE AND SIDEPORT, DILATOR, 50 CM DOUBLE DISTAL GUIDEWIRE WITH J AND STRAIGHT ENDS, 60 CM REPOSITIONING SLEEVE, SYRINGE AND 18 GA. XTW NEEDLE

## (undated) DEVICE — LOADING SYSTEM, EVOLUT FX, 34MM

## (undated) DEVICE — CABLE, PACING PATIENT BIPOLAR 8'

## (undated) DEVICE — DEVICE, CLOSURE, PERCLOSE, PROSTYLE

## (undated) DEVICE — CATHETER, ANGIO, IMPULSE, PIGTAIL, 6 FR X 110 CM

## (undated) DEVICE — CLOSURE DEVICE, VASCULAR, ANGIO-SEAL, VIP, 6FR, LF

## (undated) DEVICE — PAD, ELECTRODE DEFIB PADPRO ADULT STRL W/ADAPTER